# Patient Record
Sex: FEMALE | Race: WHITE | HISPANIC OR LATINO
[De-identification: names, ages, dates, MRNs, and addresses within clinical notes are randomized per-mention and may not be internally consistent; named-entity substitution may affect disease eponyms.]

---

## 2018-05-24 ENCOUNTER — APPOINTMENT (OUTPATIENT)
Dept: POPULATION HEALTH | Facility: CLINIC | Age: 23
End: 2018-05-24
Payer: COMMERCIAL

## 2018-05-24 PROCEDURE — 36415 COLL VENOUS BLD VENIPUNCTURE: CPT

## 2018-05-24 PROCEDURE — 99202 OFFICE O/P NEW SF 15 MIN: CPT | Mod: 25

## 2020-03-30 ENCOUNTER — EMERGENCY (EMERGENCY)
Facility: HOSPITAL | Age: 25
LOS: 1 days | Discharge: HOME | End: 2020-03-30
Attending: EMERGENCY MEDICINE | Admitting: EMERGENCY MEDICINE
Payer: MEDICAID

## 2020-03-30 VITALS
WEIGHT: 199.96 LBS | OXYGEN SATURATION: 97 % | SYSTOLIC BLOOD PRESSURE: 118 MMHG | HEIGHT: 67 IN | RESPIRATION RATE: 16 BRPM | HEART RATE: 100 BPM | TEMPERATURE: 100 F | DIASTOLIC BLOOD PRESSURE: 74 MMHG

## 2020-03-30 VITALS
OXYGEN SATURATION: 98 % | DIASTOLIC BLOOD PRESSURE: 63 MMHG | SYSTOLIC BLOOD PRESSURE: 110 MMHG | RESPIRATION RATE: 16 BRPM | TEMPERATURE: 100 F | HEART RATE: 81 BPM

## 2020-03-30 LAB — HCG UR QL: NEGATIVE — SIGNIFICANT CHANGE UP

## 2020-03-30 PROCEDURE — 99284 EMERGENCY DEPT VISIT MOD MDM: CPT

## 2020-03-30 PROCEDURE — 96374 THER/PROPH/DIAG INJ IV PUSH: CPT

## 2020-03-30 PROCEDURE — 81025 URINE PREGNANCY TEST: CPT

## 2020-03-30 PROCEDURE — 96375 TX/PRO/DX INJ NEW DRUG ADDON: CPT

## 2020-03-30 PROCEDURE — 99284 EMERGENCY DEPT VISIT MOD MDM: CPT | Mod: 25

## 2020-03-30 RX ORDER — ACETAMINOPHEN 500 MG
975 TABLET ORAL ONCE
Refills: 0 | Status: COMPLETED | OUTPATIENT
Start: 2020-03-30 | End: 2020-03-30

## 2020-03-30 RX ORDER — METOCLOPRAMIDE HCL 10 MG
10 TABLET ORAL ONCE
Refills: 0 | Status: COMPLETED | OUTPATIENT
Start: 2020-03-30 | End: 2020-03-30

## 2020-03-30 RX ORDER — SODIUM CHLORIDE 9 MG/ML
1000 INJECTION INTRAMUSCULAR; INTRAVENOUS; SUBCUTANEOUS ONCE
Refills: 0 | Status: COMPLETED | OUTPATIENT
Start: 2020-03-30 | End: 2020-03-30

## 2020-03-30 RX ORDER — ONDANSETRON 8 MG/1
1 TABLET, FILM COATED ORAL
Qty: 12 | Refills: 0
Start: 2020-03-30 | End: 2020-04-01

## 2020-03-30 RX ORDER — DIPHENHYDRAMINE HCL 50 MG
25 CAPSULE ORAL ONCE
Refills: 0 | Status: COMPLETED | OUTPATIENT
Start: 2020-03-30 | End: 2020-03-30

## 2020-03-30 RX ADMIN — Medication 975 MILLIGRAM(S): at 15:52

## 2020-03-30 RX ADMIN — Medication 25 MILLIGRAM(S): at 15:52

## 2020-03-30 RX ADMIN — SODIUM CHLORIDE 1000 MILLILITER(S): 9 INJECTION INTRAMUSCULAR; INTRAVENOUS; SUBCUTANEOUS at 15:53

## 2020-03-30 RX ADMIN — Medication 10 MILLIGRAM(S): at 15:52

## 2020-03-30 NOTE — ED ADULT NURSE NOTE - CAS ELECT INFOMATION PROVIDED
pt given CDC d/c instructions for covid 19 and advised to keep track of s/s, pt advised to get plenty of sleep, drink plenty of fluids, social distance, self isolate for fever and cough. don't share towels or food with anyone, no kissing, avoid contact with pets, throw tissues in garbage, cover cough  and return toED for shortness of breath and cough and pt verbalized understanding

## 2020-03-30 NOTE — ED ADULT NURSE REASSESSMENT NOTE - NS ED NURSE REASSESS COMMENT FT1
pt feels better headache gone and wants to go home pt re evaluated by md and to be d'c/d  iv d'c/d  no s/s infiltration upon removal  pt discharged stable and ambulatory in nad at present d/c instruction reinforced and pt verbalized understanding vital signs as charted  pt given CDC d/c instructions for covid 19 and advised to keep track of s/s, pt advised to get plenty of sleep, drink plenty of fluids, social distance, self isolate for fever and cough. don't share towels or food with anyone, no kissing, avoid contact with pets, throw tissues in garbage, cover cough  and return to ED for shortness of breath and cough and pt verbalized understanding

## 2020-03-30 NOTE — ED PROVIDER NOTE - CLINICAL SUMMARY MEDICAL DECISION MAKING FREE TEXT BOX
HA, fever, body aches, and nausea. mult family members with similar symptoms. suspect covid. no tachycardia or hypoxia. does not meet testing criteria in ED. supportive care discussed. no neuro deficits. will give IV fluids, reglan, benadryl, tylenol, reassess. lungs clear. do not suspect pneumonia

## 2020-03-30 NOTE — ED PROVIDER NOTE - CARE PLAN
Principal Discharge DX:	Acute headache  Secondary Diagnosis:	Viral illness  Secondary Diagnosis:	Nausea

## 2020-03-30 NOTE — ED PROVIDER NOTE - NEUROLOGICAL, MLM
Alert and oriented, no focal deficits, no motor or sensory deficits. symmetric eyebrow raise and smile. elevated tongue and shoulders without difficulty. normal finger to nose. good  strength bilaterally

## 2020-03-30 NOTE — ED PROVIDER NOTE - PATIENT PORTAL LINK FT
You can access the FollowMyHealth Patient Portal offered by [facility] by registering at the following website: [email or web address]. By joining [employer]’s FollowMyHealth portal, you will also be able to view your health information using other applications (apps) compatible with our system.

## 2020-03-30 NOTE — ED PROVIDER NOTE - OBJECTIVE STATEMENT
otherwise healthy 24 year old female presents with fever, body aches, nausea, and HA. woke up 5 days ago with HA. gradual in onset. next day had body aches, vomiting, and subjective fever. went to PCP Friday and had "3 injections". not sure what injections were. felt better. last night started to have dry cough. no chest pain or shortness of breath. continue to have nausea and HA. has not taken anything today for pain or symptoms. both father and mother has similar symptoms  PCP Oumar Beebe

## 2020-03-30 NOTE — ED PROVIDER NOTE - NSFOLLOWUPINSTRUCTIONS_ED_ALL_ED_FT
zofran every 6-8 hours for nausea       At this time you have symptoms concerning for COVID 19. Currently you do not meet criteria for inpatient admission. You are being sent home at this time for home isolation. It is currently recommended at this time  that you isolate for the next 14 days unless further instructions are provided at a later date. All those who have been in close contact with you should also voluntary go on home isolation.   When home on home isolation please try to use your own bathroom and bedroom and limit contact with those around you as much as possible.   Continue Tylenol per label instructions as needed for fever and body aches  Salt water rinses as needed for sore throat   Advance activity as tolerated  Please return to the ED for any concerning signs including increased difficulty breathing or signs of respiratory distress  If you chose to get tested there are several outpatient testing sites that you can call to schedule a time for outpatient testing   Phone number for [facility] is [phone]  Phone number for [facility] [phone]

## 2020-03-30 NOTE — ED PROVIDER NOTE - PROGRESS NOTE DETAILS
Jose GRISSOM for ED attending, Dr. Aly. 25 y/o female with no PMHx presents to ED c/o fever. Patient reports 5 days of headache, fever, non productive cough, nausea, 1 episode of vomiting. Patient has multiple family members at home with similar symptoms. No rash, photophobia, neck stiffness, weakness, numbness, abd pain, CP. On exam, well developed, well nourished no distress, eyes MARVEL, no photophobia, MMM, no meningisms, S1S2 RRR, CTA, abd soft non tender no CVAT, skin warm dry no rash. Neuro no motor sensory deficits. Jose GRISSOM for ED attending, Dr. Aly. 23 y/o female with no PMHx presents to ED c/o fever. Patient reports 5 days of headache, fever, non productive cough, nausea, 1 episode of vomiting. Patient has multiple family members at home with similar symptoms. No rash, photophobia, neck stiffness, weakness, numbness, abd pain, CP. On exam, well developed, well nourished no distress, eyes PERRL, pink conjunctiva, no photophobia, MMM, no meningismus, S1S2 RRR, lungs CTA, abd soft non tender no CVAT, skin warm dry no rash. Neuro no motor sensory deficits. Reevaluated patient at bedside.  Patient feeling much improved. HA and nausea resolved. no fever or body aches. no chest pain or shortness of breath.  suspect covid. supportive care discussed. no tachycardia or hypoxia.    An opportunity to ask questions was given.  Discussed the importance of prompt, close medical follow-up.  Patient will return with any changes, concerns or persistent / worsening symptoms.  Understanding of all instructions verbalized.

## 2022-07-05 ENCOUNTER — EMERGENCY (EMERGENCY)
Facility: HOSPITAL | Age: 27
LOS: 1 days | Discharge: HOME | End: 2022-07-05
Attending: EMERGENCY MEDICINE | Admitting: EMERGENCY MEDICINE
Payer: MEDICAID

## 2022-07-05 VITALS
HEART RATE: 78 BPM | RESPIRATION RATE: 16 BRPM | SYSTOLIC BLOOD PRESSURE: 118 MMHG | TEMPERATURE: 98 F | DIASTOLIC BLOOD PRESSURE: 73 MMHG | OXYGEN SATURATION: 99 %

## 2022-07-05 VITALS
TEMPERATURE: 98 F | RESPIRATION RATE: 15 BRPM | OXYGEN SATURATION: 99 % | HEIGHT: 67 IN | SYSTOLIC BLOOD PRESSURE: 116 MMHG | HEART RATE: 83 BPM | DIASTOLIC BLOOD PRESSURE: 78 MMHG | WEIGHT: 229.94 LBS

## 2022-07-05 PROBLEM — Z78.9 OTHER SPECIFIED HEALTH STATUS: Chronic | Status: ACTIVE | Noted: 2020-03-30

## 2022-07-05 LAB
ALBUMIN SERPL ELPH-MCNC: 3.7 G/DL — SIGNIFICANT CHANGE UP (ref 3.3–5)
ALP SERPL-CCNC: 82 U/L — SIGNIFICANT CHANGE UP (ref 30–120)
ALT FLD-CCNC: 20 U/L DA — SIGNIFICANT CHANGE UP (ref 10–60)
ANION GAP SERPL CALC-SCNC: 9 MMOL/L — SIGNIFICANT CHANGE UP (ref 5–17)
AST SERPL-CCNC: 8 U/L — LOW (ref 10–40)
BASOPHILS # BLD AUTO: 0.05 K/UL — SIGNIFICANT CHANGE UP (ref 0–0.2)
BASOPHILS NFR BLD AUTO: 0.5 % — SIGNIFICANT CHANGE UP (ref 0–2)
BILIRUB SERPL-MCNC: 0.7 MG/DL — SIGNIFICANT CHANGE UP (ref 0.2–1.2)
BUN SERPL-MCNC: 13 MG/DL — SIGNIFICANT CHANGE UP (ref 7–23)
CALCIUM SERPL-MCNC: 8.9 MG/DL — SIGNIFICANT CHANGE UP (ref 8.4–10.5)
CHLORIDE SERPL-SCNC: 103 MMOL/L — SIGNIFICANT CHANGE UP (ref 96–108)
CO2 SERPL-SCNC: 25 MMOL/L — SIGNIFICANT CHANGE UP (ref 22–31)
CREAT SERPL-MCNC: 0.5 MG/DL — SIGNIFICANT CHANGE UP (ref 0.5–1.3)
EGFR: 133 ML/MIN/1.73M2 — SIGNIFICANT CHANGE UP
EOSINOPHIL # BLD AUTO: 0.41 K/UL — SIGNIFICANT CHANGE UP (ref 0–0.5)
EOSINOPHIL NFR BLD AUTO: 4.5 % — SIGNIFICANT CHANGE UP (ref 0–6)
GLUCOSE SERPL-MCNC: 95 MG/DL — SIGNIFICANT CHANGE UP (ref 70–99)
HCG UR QL: NEGATIVE — SIGNIFICANT CHANGE UP
HCT VFR BLD CALC: 37.3 % — SIGNIFICANT CHANGE UP (ref 34.5–45)
HGB BLD-MCNC: 12.3 G/DL — SIGNIFICANT CHANGE UP (ref 11.5–15.5)
IMM GRANULOCYTES NFR BLD AUTO: 0.3 % — SIGNIFICANT CHANGE UP (ref 0–1.5)
LACTATE SERPL-SCNC: 0.6 MMOL/L — LOW (ref 0.7–2)
LYMPHOCYTES # BLD AUTO: 2.62 K/UL — SIGNIFICANT CHANGE UP (ref 1–3.3)
LYMPHOCYTES # BLD AUTO: 28.5 % — SIGNIFICANT CHANGE UP (ref 13–44)
MCHC RBC-ENTMCNC: 28 PG — SIGNIFICANT CHANGE UP (ref 27–34)
MCHC RBC-ENTMCNC: 33 GM/DL — SIGNIFICANT CHANGE UP (ref 32–36)
MCV RBC AUTO: 85 FL — SIGNIFICANT CHANGE UP (ref 80–100)
MONOCYTES # BLD AUTO: 0.4 K/UL — SIGNIFICANT CHANGE UP (ref 0–0.9)
MONOCYTES NFR BLD AUTO: 4.3 % — SIGNIFICANT CHANGE UP (ref 2–14)
NEUTROPHILS # BLD AUTO: 5.69 K/UL — SIGNIFICANT CHANGE UP (ref 1.8–7.4)
NEUTROPHILS NFR BLD AUTO: 61.9 % — SIGNIFICANT CHANGE UP (ref 43–77)
NRBC # BLD: 0 /100 WBCS — SIGNIFICANT CHANGE UP (ref 0–0)
PLATELET # BLD AUTO: 265 K/UL — SIGNIFICANT CHANGE UP (ref 150–400)
POTASSIUM SERPL-MCNC: 3.7 MMOL/L — SIGNIFICANT CHANGE UP (ref 3.5–5.3)
POTASSIUM SERPL-SCNC: 3.7 MMOL/L — SIGNIFICANT CHANGE UP (ref 3.5–5.3)
PROT SERPL-MCNC: 7.7 G/DL — SIGNIFICANT CHANGE UP (ref 6–8.3)
RBC # BLD: 4.39 M/UL — SIGNIFICANT CHANGE UP (ref 3.8–5.2)
RBC # FLD: 13.4 % — SIGNIFICANT CHANGE UP (ref 10.3–14.5)
SODIUM SERPL-SCNC: 137 MMOL/L — SIGNIFICANT CHANGE UP (ref 135–145)
WBC # BLD: 9.2 K/UL — SIGNIFICANT CHANGE UP (ref 3.8–10.5)
WBC # FLD AUTO: 9.2 K/UL — SIGNIFICANT CHANGE UP (ref 3.8–10.5)

## 2022-07-05 PROCEDURE — 96365 THER/PROPH/DIAG IV INF INIT: CPT

## 2022-07-05 PROCEDURE — 99285 EMERGENCY DEPT VISIT HI MDM: CPT

## 2022-07-05 PROCEDURE — 83605 ASSAY OF LACTIC ACID: CPT

## 2022-07-05 PROCEDURE — 93971 EXTREMITY STUDY: CPT

## 2022-07-05 PROCEDURE — 99284 EMERGENCY DEPT VISIT MOD MDM: CPT | Mod: 25

## 2022-07-05 PROCEDURE — 93971 EXTREMITY STUDY: CPT | Mod: 26,LT

## 2022-07-05 PROCEDURE — 80053 COMPREHEN METABOLIC PANEL: CPT

## 2022-07-05 PROCEDURE — 81025 URINE PREGNANCY TEST: CPT

## 2022-07-05 PROCEDURE — 87040 BLOOD CULTURE FOR BACTERIA: CPT

## 2022-07-05 PROCEDURE — 36415 COLL VENOUS BLD VENIPUNCTURE: CPT

## 2022-07-05 PROCEDURE — 85025 COMPLETE CBC W/AUTO DIFF WBC: CPT

## 2022-07-05 RX ADMIN — Medication 600 MILLIGRAM(S): at 16:46

## 2022-07-05 RX ADMIN — Medication 100 MILLIGRAM(S): at 16:23

## 2022-07-05 NOTE — ED PROVIDER NOTE - MUSCULOSKELETAL, MLM
+scaly excoriated skin noted to left anterior lower leg with surrounding erythema noted to left lower leg extending to posterior lower leg with swelling of LLE and slightly increased warmth, no vesicles or drainage noted, no streaking noted, calf NT, neg homanns signs, toes warm & mobile, distal pulses and sensation intact, NVI

## 2022-07-05 NOTE — ED PROVIDER NOTE - NSFOLLOWUPINSTRUCTIONS_ED_ALL_ED_FT
Follow up with your PMD in 2 days for re-evaluation, ongoing care and treatment. Elevate leg, take full course of antibiotics as prescribed. If having worsening of symptoms, streaking, fever or other related symptoms, RETURN TO THE ER IMMEDIATELY.     Cellulitis, Adult       Cellulitis is a skin infection. The infected area is often warm, red, swollen, and sore. It occurs most often in the arms and lower legs. It is very important to get treated for this condition.      What are the causes?    This condition is caused by bacteria. The bacteria enter through a break in the skin, such as a cut, burn, insect bite, open sore, or crack.      What increases the risk?    This condition is more likely to occur in people who:  •Have a weak body defense system (immune system).       •Have open cuts, burns, bites, or scrapes on the skin.      •Are older than 60 years of age.      •Have a blood sugar problem (diabetes).       •Have a long-lasting (chronic) liver disease (cirrhosis) or kidney disease.      •Are very overweight (obese).    •Have a skin problem, such as:  •Itchy rash (eczema).      •Slow movement of blood in the veins (venous stasis).      •Fluid buildup below the skin (edema).        •Have been treated with high-energy rays (radiation).      •Use IV drugs.         What are the signs or symptoms?    Symptoms of this condition include:•Skin that is:  •Red.      •Streaking.      •Spotting.      •Swollen.      •Sore or painful when you touch it.      •Warm.        •A fever.      •Chills.       •Blisters.        How is this diagnosed?    This condition is diagnosed based on:  •Medical history.      •Physical exam.      •Blood tests.      •Imaging tests.        How is this treated?    Treatment for this condition may include:  •Medicines to treat infections or allergies.    •Home care, such as:  •Rest.      •Placing cold or warm cloths (compresses) on the skin.        •Hospital care, if the condition is very bad.        Follow these instructions at home:    Medicines     •Take over-the-counter and prescription medicines only as told by your doctor.      •If you were prescribed an antibiotic medicine, take it as told by your doctor. Do not stop taking it even if you start to feel better.        General instructions      •Drink enough fluid to keep your pee (urine) pale yellow.      • Do not touch or rub the infected area.      •Raise (elevate) the infected area above the level of your heart while you are sitting or lying down.      •Place cold or warm cloths on the area as told by your doctor.      •Keep all follow-up visits as told by your doctor. This is important.        Contact a doctor if:    •You have a fever.      •You do not start to get better after 1–2 days of treatment.      •Your bone or joint under the infected area starts to hurt after the skin has healed.      •Your infection comes back. This can happen in the same area or another area.      •You have a swollen bump in the area.      •You have new symptoms.      •You feel ill and have muscle aches and pains.        Get help right away if:    •Your symptoms get worse.      •You feel very sleepy.      •You throw up (vomit) or have watery poop (diarrhea) for a long time.      •You see red streaks coming from the area.      •Your red area gets larger.      •Your red area turns dark in color.      These symptoms may represent a serious problem that is an emergency. Do not wait to see if the symptoms will go away. Get medical help right away. Call your local emergency services (911 in the [place] Do not drive yourself to the hospital.       Summary    •Cellulitis is a skin infection. The area is often warm, red, swollen, and sore.      •This condition is treated with medicines, rest, and cold and warm cloths.      •Take all medicines only as told by your doctor.      •Tell your doctor if symptoms do not start to get better after 1–2 days of treatment.      This information is not intended to replace advice given to you by your health care provider. Make sure you discuss any questions you have with your health care provider.

## 2022-07-05 NOTE — ED ADULT NURSE NOTE - OBJECTIVE STATEMENT
Patient presents for evaluation of redness and swelling to the right lower leg. Patient states she began with a rash to the left anterior ankle about 1 week ago after an outdoor dining meal. The rash was itchy so she applied rubbing alcohol but was scratching a lot and noted similar rash to the medial aspect of her right shin and increasing on the left lower leg. Patient then applied Benadryl cream which she feels increased the redness and itchiness. Patient saw her PMD today, rash edges were marked by her Dr. and she was sent to the ED for further eval. Patient denies any pain, but feels tightness of the skin and swelling.

## 2022-07-05 NOTE — ED PROVIDER NOTE - PATIENT PORTAL LINK FT
You can access the FollowMyHealth Patient Portal offered by [facility] by registering at the following website: [email or web address]. By joining [employer]’s FollowMyHealth portal, you will also be able to view your health information using other applications (apps) compatible with our system.

## 2022-07-05 NOTE — ED PROVIDER NOTE - CLINICAL SUMMARY MEDICAL DECISION MAKING FREE TEXT BOX
27 y/o female with no pertinent PMHx presents to the ED c/o redness and swelling to her left leg, worsening over the past 2 weeks. Pt states she developed an itchy rash about 2 weeks ago and was scratching it a lot. States the redness spread and she also developed swelling, so came to the ED for evaluation. Denies any outdoor contacts. Denies fever.    PE: Vital signs stable, afebrile, in no distress. Left leg with erythema, swelling and tenderness from ankle to mid-calf. +Area of scaly, excoriated skin to anterior calf. No vesicles or drainage. No posterior calf tenderness.    Impression: left leg cellulitis, rule-out DVT. Plan: labs, Doppler, antibiotics, and PCP follow-up.

## 2022-07-05 NOTE — ED PROVIDER NOTE - NS ED ATTENDING STATEMENT MOD
This was a shared visit with the [facility]. I reviewed and verified the documentation and independently performed the documented:

## 2022-07-05 NOTE — ED PROVIDER NOTE - OBJECTIVE STATEMENT
27 y/o F with no pmhx presents with c/o redness to left lower leg x 2 weeks. States that she initially noted mild redness to the region of her ankle on 6/18 and then started progressing with some burning/itching. Pt applied benadryl cream and states that it got worse after. Pt c/o swelling and tightness to her lower leg L. States that she was seen by her PCP today, Dr. Beebe and sent to ED for US and IV abx. Pt denies fever, chills, numbness, tingling, streaking, trauma, CP, SOB, hx of dvt/pe, N/V, calf pain or other symptoms.

## 2022-07-05 NOTE — ED PROVIDER NOTE - PROGRESS NOTE DETAILS
Reevaluated patient at bedside.  Patient feeling much improved.  Discussed the results of all diagnostic testing in ED and copies of all reports given. Advised will rx clindamycin, f/u PCP for recheck in 2 days, strict return precautions for streaking, fever, worsening symptoms.  An opportunity to ask questions was given.  Discussed the importance of prompt, close medical follow-up.  Patient will return with any changes, concerns or persistent / worsening symptoms.  Understanding of all instructions verbalized.

## 2022-07-05 NOTE — ED PROVIDER NOTE - SKIN, MLM
+scaly excoriated skin noted to left anterior lower leg with surrounding erythema noted to left lower leg extending to posterior lower leg, no vesicles or drainage noted, no streaking

## 2022-07-07 ENCOUNTER — INPATIENT (INPATIENT)
Facility: HOSPITAL | Age: 27
LOS: 3 days | Discharge: HOME | DRG: 607 | End: 2022-07-11
Attending: HOSPITALIST | Admitting: STUDENT IN AN ORGANIZED HEALTH CARE EDUCATION/TRAINING PROGRAM
Payer: MEDICAID

## 2022-07-07 VITALS
DIASTOLIC BLOOD PRESSURE: 98 MMHG | WEIGHT: 229.94 LBS | HEIGHT: 67 IN | TEMPERATURE: 99 F | OXYGEN SATURATION: 99 % | RESPIRATION RATE: 18 BRPM | HEART RATE: 89 BPM | SYSTOLIC BLOOD PRESSURE: 133 MMHG

## 2022-07-07 PROCEDURE — 99285 EMERGENCY DEPT VISIT HI MDM: CPT

## 2022-07-08 DIAGNOSIS — Z29.9 ENCOUNTER FOR PROPHYLACTIC MEASURES, UNSPECIFIED: ICD-10-CM

## 2022-07-08 DIAGNOSIS — L03.116 CELLULITIS OF LEFT LOWER LIMB: ICD-10-CM

## 2022-07-08 DIAGNOSIS — L03.90 CELLULITIS, UNSPECIFIED: ICD-10-CM

## 2022-07-08 LAB
ALBUMIN SERPL ELPH-MCNC: 3.9 G/DL — SIGNIFICANT CHANGE UP (ref 3.3–5)
ALP SERPL-CCNC: 81 U/L — SIGNIFICANT CHANGE UP (ref 40–120)
ALT FLD-CCNC: 12 U/L — SIGNIFICANT CHANGE UP (ref 10–45)
ANION GAP SERPL CALC-SCNC: 10 MMOL/L — SIGNIFICANT CHANGE UP (ref 5–17)
AST SERPL-CCNC: 9 U/L — LOW (ref 10–40)
BASOPHILS # BLD AUTO: 0.05 K/UL — SIGNIFICANT CHANGE UP (ref 0–0.2)
BASOPHILS NFR BLD AUTO: 0.5 % — SIGNIFICANT CHANGE UP (ref 0–2)
BILIRUB SERPL-MCNC: 0.6 MG/DL — SIGNIFICANT CHANGE UP (ref 0.2–1.2)
BUN SERPL-MCNC: 17 MG/DL — SIGNIFICANT CHANGE UP (ref 7–23)
CALCIUM SERPL-MCNC: 8.6 MG/DL — SIGNIFICANT CHANGE UP (ref 8.4–10.5)
CHLORIDE SERPL-SCNC: 104 MMOL/L — SIGNIFICANT CHANGE UP (ref 96–108)
CK SERPL-CCNC: 73 U/L — SIGNIFICANT CHANGE UP (ref 25–170)
CO2 SERPL-SCNC: 23 MMOL/L — SIGNIFICANT CHANGE UP (ref 22–31)
CREAT SERPL-MCNC: 0.76 MG/DL — SIGNIFICANT CHANGE UP (ref 0.5–1.3)
EGFR: 111 ML/MIN/1.73M2 — SIGNIFICANT CHANGE UP
EOSINOPHIL # BLD AUTO: 0.45 K/UL — SIGNIFICANT CHANGE UP (ref 0–0.5)
EOSINOPHIL NFR BLD AUTO: 4.7 % — SIGNIFICANT CHANGE UP (ref 0–6)
ERYTHROCYTE [SEDIMENTATION RATE] IN BLOOD: 34 MM/HR — HIGH (ref 0–15)
FLUAV AG NPH QL: SIGNIFICANT CHANGE UP
FLUBV AG NPH QL: SIGNIFICANT CHANGE UP
GLUCOSE SERPL-MCNC: 107 MG/DL — HIGH (ref 70–99)
HCT VFR BLD CALC: 34.3 % — LOW (ref 34.5–45)
HGB BLD-MCNC: 10.9 G/DL — LOW (ref 11.5–15.5)
IMM GRANULOCYTES NFR BLD AUTO: 0.4 % — SIGNIFICANT CHANGE UP (ref 0–1.5)
LYMPHOCYTES # BLD AUTO: 2.96 K/UL — SIGNIFICANT CHANGE UP (ref 1–3.3)
LYMPHOCYTES # BLD AUTO: 30.9 % — SIGNIFICANT CHANGE UP (ref 13–44)
MAGNESIUM SERPL-MCNC: 2 MG/DL — SIGNIFICANT CHANGE UP (ref 1.6–2.6)
MCHC RBC-ENTMCNC: 27.7 PG — SIGNIFICANT CHANGE UP (ref 27–34)
MCHC RBC-ENTMCNC: 31.8 GM/DL — LOW (ref 32–36)
MCV RBC AUTO: 87.3 FL — SIGNIFICANT CHANGE UP (ref 80–100)
MONOCYTES # BLD AUTO: 0.54 K/UL — SIGNIFICANT CHANGE UP (ref 0–0.9)
MONOCYTES NFR BLD AUTO: 5.6 % — SIGNIFICANT CHANGE UP (ref 2–14)
NEUTROPHILS # BLD AUTO: 5.55 K/UL — SIGNIFICANT CHANGE UP (ref 1.8–7.4)
NEUTROPHILS NFR BLD AUTO: 57.9 % — SIGNIFICANT CHANGE UP (ref 43–77)
NRBC # BLD: 0 /100 WBCS — SIGNIFICANT CHANGE UP (ref 0–0)
PLATELET # BLD AUTO: 251 K/UL — SIGNIFICANT CHANGE UP (ref 150–400)
POTASSIUM SERPL-MCNC: 3.8 MMOL/L — SIGNIFICANT CHANGE UP (ref 3.5–5.3)
POTASSIUM SERPL-SCNC: 3.8 MMOL/L — SIGNIFICANT CHANGE UP (ref 3.5–5.3)
PROT SERPL-MCNC: 7 G/DL — SIGNIFICANT CHANGE UP (ref 6–8.3)
RBC # BLD: 3.93 M/UL — SIGNIFICANT CHANGE UP (ref 3.8–5.2)
RBC # FLD: 13.4 % — SIGNIFICANT CHANGE UP (ref 10.3–14.5)
RSV RNA NPH QL NAA+NON-PROBE: SIGNIFICANT CHANGE UP
SARS-COV-2 RNA SPEC QL NAA+PROBE: SIGNIFICANT CHANGE UP
SODIUM SERPL-SCNC: 137 MMOL/L — SIGNIFICANT CHANGE UP (ref 135–145)
WBC # BLD: 9.59 K/UL — SIGNIFICANT CHANGE UP (ref 3.8–10.5)
WBC # FLD AUTO: 9.59 K/UL — SIGNIFICANT CHANGE UP (ref 3.8–10.5)

## 2022-07-08 PROCEDURE — 99221 1ST HOSP IP/OBS SF/LOW 40: CPT

## 2022-07-08 PROCEDURE — 12345: CPT | Mod: NC

## 2022-07-08 PROCEDURE — 99223 1ST HOSP IP/OBS HIGH 75: CPT

## 2022-07-08 RX ORDER — VANCOMYCIN HCL 1 G
1000 VIAL (EA) INTRAVENOUS EVERY 8 HOURS
Refills: 0 | Status: DISCONTINUED | OUTPATIENT
Start: 2022-07-08 | End: 2022-07-10

## 2022-07-08 RX ORDER — FAMOTIDINE 10 MG/ML
20 INJECTION INTRAVENOUS ONCE
Refills: 0 | Status: COMPLETED | OUTPATIENT
Start: 2022-07-08 | End: 2022-07-08

## 2022-07-08 RX ORDER — ENOXAPARIN SODIUM 100 MG/ML
40 INJECTION SUBCUTANEOUS EVERY 24 HOURS
Refills: 0 | Status: DISCONTINUED | OUTPATIENT
Start: 2022-07-08 | End: 2022-07-09

## 2022-07-08 RX ORDER — CHLORHEXIDINE GLUCONATE 213 G/1000ML
1 SOLUTION TOPICAL DAILY
Refills: 0 | Status: DISCONTINUED | OUTPATIENT
Start: 2022-07-08 | End: 2022-07-11

## 2022-07-08 RX ORDER — VANCOMYCIN HCL 1 G
1000 VIAL (EA) INTRAVENOUS EVERY 12 HOURS
Refills: 0 | Status: DISCONTINUED | OUTPATIENT
Start: 2022-07-08 | End: 2022-07-08

## 2022-07-08 RX ORDER — CEFAZOLIN SODIUM 1 G
2000 VIAL (EA) INJECTION ONCE
Refills: 0 | Status: COMPLETED | OUTPATIENT
Start: 2022-07-08 | End: 2022-07-08

## 2022-07-08 RX ORDER — LORATADINE 10 MG/1
10 TABLET ORAL DAILY
Refills: 0 | Status: DISCONTINUED | OUTPATIENT
Start: 2022-07-08 | End: 2022-07-11

## 2022-07-08 RX ADMIN — Medication 100 MILLIGRAM(S): at 00:48

## 2022-07-08 RX ADMIN — Medication 1 APPLICATION(S): at 18:14

## 2022-07-08 RX ADMIN — Medication 250 MILLIGRAM(S): at 18:46

## 2022-07-08 RX ADMIN — CHLORHEXIDINE GLUCONATE 1 APPLICATION(S): 213 SOLUTION TOPICAL at 12:51

## 2022-07-08 RX ADMIN — Medication 250 MILLIGRAM(S): at 06:06

## 2022-07-08 RX ADMIN — ENOXAPARIN SODIUM 40 MILLIGRAM(S): 100 INJECTION SUBCUTANEOUS at 06:06

## 2022-07-08 NOTE — ED ADULT NURSE REASSESSMENT NOTE - NS ED NURSE REASSESS COMMENT FT1
Lab contacted, states covid swab will result at 530, Inpatient MD at bedside, pt resting comfortably, pending floor bed

## 2022-07-08 NOTE — H&P ADULT - NSHPLABSRESULTS_GEN_ALL_CORE
LABS:                      10.9   9.59  )-----------( 251      ( 08 Jul 2022 01:07 )             34.3     07-08    137  |  104  |  17  ----------------------------<  107<H>  3.8   |  23  |  0.76    Ca    8.6      08 Jul 2022 01:07  Mg     2.0     07-08    TPro  7.0  /  Alb  3.9  /  TBili  0.6  /  DBili  x   /  AST  9<L>  /  ALT  12  /  AlkPhos  81  07-08    CARDIAC MARKERS ( 08 Jul 2022 01:07 )  x     / x     / 73 U/L / x     / x        LIVER FUNCTIONS - ( 08 Jul 2022 01:07 )  Alb: 3.9 g/dL / Pro: 7.0 g/dL / ALK PHOS: 81 U/L / ALT: 12 U/L / AST: 9 U/L / GGT: x     IMAGING:   US Duplex Venous Lower Ext Ltd, Left (07.05.22 @ 15:42)  IMPRESSION:  - No evidence of left lower extremity deep venous thrombosis.    [X] Imaging personally reviewed by me

## 2022-07-08 NOTE — CONSULT NOTE ADULT - ASSESSMENT
Ms Rosado is a 27yo F w/ no PMH p/w LLE erythema, edema, warmth and tenderness to palpation x2wks. Symptoms initially started as mild redness to the region of her left ankle on 6/18 and then started progressing with some burning/itching. Pt initially applied benadryl cream to her entire leg which worsened the erythema. Associated symptoms at the time included edema and tightness. She then presented to her PMD on 07/05 who advised her to visit the ED for evaluation. There she underwent LLE US which was negative for DVT, and cultures were drawn, which have also been negative to date. She was administered Clindamycin and discharged on oral Clindamycin.   Pt then re-presented to the ED on 07/07 due to worsening of symptoms and expansion of area of erythema. Here pt is afebrile with no leukocytosis. In this ED visit she was administered Ancef given pen allergy after which she developed pruritis throughout her body. ID consulted for the same,      WORKUP  Culture - Blood (7/5):   No growth to date.  Sedimentation Rate, Erythrocyte: 34 mm/hr (07-08-22 @ 01:06)  US Duplex Venous Lower Ext Ltd, Left (07.05): No evidence of left lower extremity deep venous thrombosis.    DIAGNOSIS and IMPRESSION  ·	Cellulitis  ·	Penicllin and Cephalosporin allergy (itching, rash)    Afebrile with no leukocytosi  s/p vanc in ED    RECOMMENDATIONS        PT TO BE SEEN. PRELIM NOTE  PENDING RECS. PLEASE WAIT FOR FINAL RECS AFTER DISCUSSION WITH ATTENDINGDelfina Oneal MD, PGY5  ID fellow  [employer] Preferred  After 5pm/weekends call [phone]   Ms Rosado is a 25yo F w/ no PMH p/w LLE erythema, edema, warmth and tenderness to palpation x2wks. Symptoms initially started as mild redness to the region of her left ankle on 6/18 and then started progressing with some itching up her calf. As it started to worsen pt applied benadryl cream to her entire leg which she feels may have worsened the erythema. The redness continued to worsen and was associated with pain and swelling and she then presented to her PMD on 07/05 who advised her to visit the [facility] ED for evaluation. She was administered Clindamycin and discharged on oral Clindamycin. Pt then re-presented to the ED on 07/07 due to worsening of symptoms and expansion of area of erythema. ID consulted for the same.     WORKUP  Culture - Blood (7/5):   No growth to date.  Sedimentation Rate, Erythrocyte: 34 mm/hr (07-08-22 @ 01:06)  US Duplex Venous Lower Ext Ltd, Left (07.05): No evidence of left lower extremity deep venous thrombosis.  Blood cx (7/5): NGTD -> in HIE    DIAGNOSIS and IMPRESSION  ·	Cellulitis  ·	Penicillin and Cephalosporin allergy (itching, rash)  Afebrile with no leukocytosis  s/p Ancef in ED: She developed pruritis and hives throughout her body.  s/p Vanc in ED    RECOMMENDATIONS  c/w vanc for now      PT TO BE SEEN. PRELIM NOTE  PENDING RECS. PLEASE WAIT FOR FINAL RECS AFTER DISCUSSION WITH ATTENDINGDelfina Oneal MD, PGY5  ID fellow  [employer] Preferred  After 5pm/weekends call [phone]   Ms Rosado is a 27yo F w/ no PMH p/w LLE erythema, edema, warmth and tenderness to palpation x2wks. Symptoms initially started as mild redness to the region of her left ankle on 6/18 and then started progressing with some itching up her calf. As it started to worsen pt applied benadryl cream to her entire leg which she feels may have worsened the erythema. The redness continued to worsen and was associated with pain and swelling and she then presented to her PMD on 07/05 who advised her to visit the [facility] ED for evaluation. She was administered Clindamycin and discharged on oral Clindamycin. Pt then re-presented to the ED on 07/07 due to worsening of symptoms and expansion of area of erythema. ID consulted for the same.     WORKUP  Culture - Blood (7/5):   No growth to date.  Sedimentation Rate, Erythrocyte: 34 mm/hr (07-08-22 @ 01:06)  US Duplex Venous Lower Ext Ltd, Left (07.05): No evidence of left lower extremity deep venous thrombosis.  Blood cx (7/5): NGTD -> in HIE    DIAGNOSIS and IMPRESSION  ·	Cellulitis  ·	Penicillin and Cephalosporin allergy (itching, rash)  Afebrile with no leukocytosis  Failed outpt Clindamycin x 2 days  s/p Ancef in ED: She developed pruritis and hives throughout her body.  s/p Vanc in ED    RECOMMENDATIONS  c/w vanc for now -> Increase dose t o1gm Q8  Please check Vancomycin trough before 4th sequential dose. Target trough levels 15-20  Given significant itching, atypical matching pink rash on other leg recommend derm risa Urbina for fevers and trend WBC    Pt seen and examined Case d/w attending and primary team    Bebeto Oneal MD, PGY5  ID fellow  [employer] Teams Preferred  After 5pm/weekends call [phone]

## 2022-07-08 NOTE — ED PROVIDER NOTE - CLINICAL SUMMARY MEDICAL DECISION MAKING FREE TEXT BOX
26-year-old female with no reported past medical history presents to the ED with a rash over the left lower extremity over the last 2 weeks. vitals non actionable. PE as noted above. rash over the anterior shin. has been using po abx with further progression of cellulitis. will give abx iv, labs, reassess. DVT sudy few days ago negative. 26-year-old female with no reported past medical history presents to the ED with a rash over the left lower extremity over the last 2 weeks. vitals non actionable. PE as noted above. rash over the anterior shin. has been using po abx with further progression of cellulitis. will give abx iv, labs, reassess. DVT study few days ago negative.    THERESA Billingsley MD: Agree with resident/ACP MDM, assessment and plan as above.

## 2022-07-08 NOTE — CONSULT NOTE ADULT - SUBJECTIVE AND OBJECTIVE BOX
Patient is a 26y old  Female who presents with a chief complaint of Cellulitis (08 Jul 2022 05:03)    HPI: Ms Rosado is a 27yo F w/ no PMH p/w LLE erythema, edema, warmth and tenderness to palpation x2wks. Symptoms initially started as mild redness to the region of her left ankle on 6/18 and then started progressing with some burning/itching. Pt initially applied benadryl cream to her entire leg which worsened the erythema. Associated symptoms at the time included edema and tightness. She then presented to her PMD on 07/05 who advised her to visit the ED for evaluation. There she underwent LLE US which was negative for DVT, and cultures were drawn, which have also been negative to date. She was administered Clindamycin and discharged on oral Clindamycin.   Pt then re-presented to the ED on 07/07 due to worsening of symptoms and expansion of area of erythema. Here pt is afebrile with no leukocytosis. In this ED visit she was administered Ancef given pen allergy after which she developed pruritis throughout her body. ID consulted for the same,    She otherwise denies any F/C, numbness, tingling, trauma to the leg, CP, SOB, hx of DVT/PE, N/V, calf pain or other symptoms. No recent travel or outdoor activities.           REVIEW OF SYSTEMS  [  ] ROS unobtainable because:    [ x ] All other systems negative except as noted below    Constitutional:  [ ] fever [ ] chills  [ ] weight loss  [ ]night sweat  [ ]poor appetite/PO intake [ ]fatigue   Skin:  [ ] rash [ ] phlebitis	  Eyes: [ ] icterus [ ] pain  [ ] discharge	  ENMT: [ ] sore throat  [ ] thrush [ ] ulcers [ ] exudates [ ]anosmia  Respiratory: [ ] dyspnea [ ] hemoptysis [ ] cough [ ] sputum	  Cardiovascular:  [ ] chest pain [ ] palpitations [ ] edema	  Gastrointestinal:  [ ] nausea [ ] vomiting [ ] diarrhea [ ] constipation [ ] pain	  Genitourinary:  [ ] dysuria [ ] frequency [ ] hematuria [ ] discharge [ ] flank pain  [ ] incontinence  Musculoskeletal:  [ ] myalgias [ ] arthralgias [ ] arthritis  [ ] back pain  Neurological:  [ ] headache [ ] weakness [ ] seizures  [ ] confusion/altered mental status    prior hospital charts reviewed [V]  primary team notes reviewed [V]  other consultant notes reviewed [V]    PAST MEDICAL & SURGICAL HISTORY:  No pertinent past medical history      No significant past surgical history          SOCIAL HISTORY:  - Denied smoking/vaping/alcohol/recreational drug use    FAMILY HISTORY:  FH: asthma (Mother)        Allergies  Benadryl (Other)  cefazolin (Hives; Rash)  Demerol (Other)  latex (Rash)  penicillin (Rash)  Reglan (Dystonic RXN)        ANTIMICROBIALS:  vancomycin  IVPB 1000 every 12 hours      ANTIMICROBIALS (past 90 days):  MEDICATIONS  (STANDING):  ceFAZolin   IVPB   100 mL/Hr IV Intermittent (07-08-22 @ 00:48)    vancomycin  IVPB   250 mL/Hr IV Intermittent (07-08-22 @ 06:06)        OTHER MEDS:   MEDICATIONS  (STANDING):  enoxaparin Injectable 40 every 24 hours  loratadine 10 daily      VITALS:  Vital Signs Last 24 Hrs  T(F): 98.7 (07-08-22 @ 11:15), Max: 98.8 (07-07-22 @ 19:42)    Vital Signs Last 24 Hrs  HR: 80 (07-08-22 @ 11:15) (78 - 90)  BP: 115/75 (07-08-22 @ 10:56) (104/66 - 133/98)  RR: 18 (07-08-22 @ 11:15)  SpO2: 100% (07-08-22 @ 11:15) (98% - 100%)  Wt(kg): --    EXAM:    GA: NAD, AOx3  HEENT: oral cavity no lesion  CV: nl S1/S2, no RMG  Lungs: CTAB, No distress  Abd: BS+, soft, nontender, no rebounding pain  Ext: no edema  Neuro: No focal deficits  Skin: Intact  IV: no phlebitis    Labs:                        10.9   9.59  )-----------( 251      ( 08 Jul 2022 01:07 )             34.3     07-08    137  |  104  |  17  ----------------------------<  107<H>  3.8   |  23  |  0.76    Ca    8.6      08 Jul 2022 01:07  Mg     2.0     07-08    TPro  7.0  /  Alb  3.9  /  TBili  0.6  /  DBili  x   /  AST  9<L>  /  ALT  12  /  AlkPhos  81  07-08      WBC Trend:  WBC Count: 9.59 (07-08-22 @ 01:07)  WBC Count: 9.20 (07-05-22 @ 15:17)      Auto Neutrophil #: 5.55 K/uL (07-08-22 @ 01:07)  Auto Neutrophil #: 5.69 K/uL (07-05-22 @ 15:17)      Creatine Trend:  Creatinine, Serum: 0.76 (07-08)  Creatinine, Serum: 0.50 (07-05)      Liver Biochemical Testing Trend:  Alanine Aminotransferase (ALT/SGPT): 12 (07-08)  Alanine Aminotransferase (ALT/SGPT): 20 (07-05)  Aspartate Aminotransferase (AST/SGOT): 9 (07-08-22 @ 01:07)  Aspartate Aminotransferase (AST/SGOT): 8 (07-05-22 @ 15:17)  Bilirubin Total, Serum: 0.6 (07-08)  Bilirubin Total, Serum: 0.7 (07-05)      Trend LDH      Auto Eosinophil %: 4.7 % (07-08-22 @ 01:07)  Auto Eosinophil %: 4.5 % (07-05-22 @ 15:17)          MICROBIOLOGY:        Culture - Blood (collected 05 Jul 2022 15:17)  Source: .Blood Blood  Preliminary Report:    No growth to date.    Culture - Blood (collected 05 Jul 2022 15:17)  Source: .Blood Blood  Preliminary Report:    No growth to date.      Lactate, Blood: 0.6 (07-05 @ 15:17)  Sedimentation Rate, Erythrocyte: 34 mm/hr (07-08-22 @ 01:06)    RADIOLOGY:  imaging below personally reviewed    < from: US Duplex Venous Lower Ext Ltd, Left (07.05.22 @ 15:42) >  No evidence of left lower extremity deep venous thrombosis.  < end of copied text >   Patient is a 26y old  Female who presents with a chief complaint of Cellulitis (08 Jul 2022 05:03)    HPI: Ms Rosado is a 27yo F w/ no PMH p/w LLE erythema, edema, warmth and tenderness to palpation x2wks. Symptoms initially started as mild redness to the region of her left ankle on 6/18 and then started progressing with some itching up her calf. As it started to worsen pt applied benadryl cream to her entire leg which she feels may have worsened the erythema. The redness continued to worsen and was associated with pain and swelling and she then presented to her PMD on 07/05 who advised her to visit the ED for evaluation. She went to [facility] and there she underwent LLE US which was negative for DVT, and cultures were drawn, which are negative to date. She was administered Clindamycin and discharged on oral Clindamycin.   Pt then re-presented to the ED on 07/07 due to worsening of symptoms and expansion of area of erythema. Here pt is afebrile with no leukocytosis. In this ED visit she was administered Ancef given pen allergy after which she developed pruritis and hives throughout her body. ID consulted for the same. She otherwise denies any F/C, numbness, tingling, trauma to the leg, CP, SOB, hx of DVT/PE, N/V, calf pain or other symptoms. No recent travel or outdoor activities. No abnormal food, including no recent sea food. No recent exposure to fresh or sea water. Pt has a pet dog, but no recent bites or scratches.       REVIEW OF SYSTEMS  [  ] ROS unobtainable because:    [ x ] All other systems negative except as noted below    Constitutional:  [ ] fever [ ] chills  [ ] weight loss  [ ]night sweat  [ ]poor appetite/PO intake [ ]fatigue   Skin:  [ ] rash [ ] phlebitis	  Eyes: [ ] icterus [ ] pain  [ ] discharge	  ENMT: [ ] sore throat  [ ] thrush [ ] ulcers [ ] exudates [ ]anosmia  Respiratory: [ ] dyspnea [ ] hemoptysis [ ] cough [ ] sputum	  Cardiovascular:  [ ] chest pain [ ] palpitations [ ] edema	  Gastrointestinal:  [ ] nausea [ ] vomiting [ ] diarrhea [ ] constipation [ ] pain	  Genitourinary:  [ ] dysuria [ ] frequency [ ] hematuria [ ] discharge [ ] flank pain  [ ] incontinence  Musculoskeletal:  [ ] myalgias [ ] arthralgias [ ] arthritis  [ ] back pain  Neurological:  [ ] headache [ ] weakness [ ] seizures  [ ] confusion/altered mental status    prior hospital charts reviewed [V]  primary team notes reviewed [V]  other consultant notes reviewed [V]    PAST MEDICAL & SURGICAL HISTORY:  No pertinent past medical history      No significant past surgical history          SOCIAL HISTORY:  - Denied smoking/vaping/alcohol/recreational drug use    FAMILY HISTORY:  FH: asthma (Mother)        Allergies  Benadryl (Other)  cefazolin (Hives; Rash)  Demerol (Other)  latex (Rash)  penicillin (Rash)  Reglan (Dystonic RXN)        ANTIMICROBIALS:  vancomycin  IVPB 1000 every 12 hours      ANTIMICROBIALS (past 90 days):  MEDICATIONS  (STANDING):  ceFAZolin   IVPB   100 mL/Hr IV Intermittent (07-08-22 @ 00:48)    vancomycin  IVPB   250 mL/Hr IV Intermittent (07-08-22 @ 06:06)        OTHER MEDS:   MEDICATIONS  (STANDING):  enoxaparin Injectable 40 every 24 hours  loratadine 10 daily      VITALS:  Vital Signs Last 24 Hrs  T(F): 98.7 (07-08-22 @ 11:15), Max: 98.8 (07-07-22 @ 19:42)    Vital Signs Last 24 Hrs  HR: 80 (07-08-22 @ 11:15) (78 - 90)  BP: 115/75 (07-08-22 @ 10:56) (104/66 - 133/98)  RR: 18 (07-08-22 @ 11:15)  SpO2: 100% (07-08-22 @ 11:15) (98% - 100%)  Wt(kg): --    EXAM:    Constitutional: Not in acute distress  Eyes: pupils bilaterally reactive to light. No icterus.  Oral cavity: Clear, no lesions  Neck: No neck vein distension noted  RS: Chest clear to auscultation bilaterally. No wheeze/rhonchi/crepitations.  CVS: S1, S2 heard. Regular rate and rhythm. No murmurs/rubs/gallops.  Abdomen: Soft. No guarding/rigidity/tenderness.  : No acute abnormalities  Extremities: Warm. No pedal edema  Skin: Left calf erythema, warmth and tenderness. No wound or abscess.   Vascular: No evidence of phlebitis  Neuro: Alert, oriented to time/place/person  Cranial nerves 2-12 grossly normal. No focal abnormalities      Labs:                        10.9   9.59  )-----------( 251      ( 08 Jul 2022 01:07 )             34.3     07-08    137  |  104  |  17  ----------------------------<  107<H>  3.8   |  23  |  0.76    Ca    8.6      08 Jul 2022 01:07  Mg     2.0     07-08    TPro  7.0  /  Alb  3.9  /  TBili  0.6  /  DBili  x   /  AST  9<L>  /  ALT  12  /  AlkPhos  81  07-08      WBC Trend:  WBC Count: 9.59 (07-08-22 @ 01:07)  WBC Count: 9.20 (07-05-22 @ 15:17)      Auto Neutrophil #: 5.55 K/uL (07-08-22 @ 01:07)  Auto Neutrophil #: 5.69 K/uL (07-05-22 @ 15:17)      Creatine Trend:  Creatinine, Serum: 0.76 (07-08)  Creatinine, Serum: 0.50 (07-05)      Liver Biochemical Testing Trend:  Alanine Aminotransferase (ALT/SGPT): 12 (07-08)  Alanine Aminotransferase (ALT/SGPT): 20 (07-05)  Aspartate Aminotransferase (AST/SGOT): 9 (07-08-22 @ 01:07)  Aspartate Aminotransferase (AST/SGOT): 8 (07-05-22 @ 15:17)  Bilirubin Total, Serum: 0.6 (07-08)  Bilirubin Total, Serum: 0.7 (07-05)      Trend LDH      Auto Eosinophil %: 4.7 % (07-08-22 @ 01:07)  Auto Eosinophil %: 4.5 % (07-05-22 @ 15:17)          MICROBIOLOGY:        Culture - Blood (collected 05 Jul 2022 15:17)  Source: .Blood Blood  Preliminary Report:    No growth to date.    Culture - Blood (collected 05 Jul 2022 15:17)  Source: .Blood Blood  Preliminary Report:    No growth to date.      Lactate, Blood: 0.6 (07-05 @ 15:17)  Sedimentation Rate, Erythrocyte: 34 mm/hr (07-08-22 @ 01:06)    RADIOLOGY:  imaging below personally reviewed    < from: US Duplex Venous Lower Ext Ltd, Left (07.05.22 @ 15:42) >  No evidence of left lower extremity deep venous thrombosis.  < end of copied text >

## 2022-07-08 NOTE — H&P ADULT - NSHPPHYSICALEXAM_GEN_ALL_CORE
Vital Signs Last 24 Hrs  T(C): 36.9 (08 Jul 2022 00:58), Max: 37.1 (07 Jul 2022 19:42)  T(F): 98.4 (08 Jul 2022 00:58), Max: 98.8 (07 Jul 2022 19:42)  HR: 90 (08 Jul 2022 00:58) (89 - 90)  BP: 113/61 (08 Jul 2022 00:58) (113/61 - 133/98)  RR: 18 (08 Jul 2022 00:58) (18 - 18)  SpO2: 100% (08 Jul 2022 00:58) (99% - 100%)    CONSTITUTIONAL: Well-groomed, in no apparent distress  EYES: No conjunctival or scleral injection, non-icteric;   ENMT: No external nasal lesions; MMM  NECK: Trachea midline without palpable neck mass; thyroid not enlarged and non-tender  RESPIRATORY: Breathing comfortably; no dullness to percussion; lungs CTA without wheeze/rhonchi/rales  CARDIOVASCULAR: +S1S2, RRR, no M/G/R; pedal pulses full and symmetric; no lower extremity edema  GASTROINTESTINAL: No palpable masses or tenderness, +BS throughout, no rebound/guarding; no hepatosplenomegaly; no hernia palpated  LYMPHATIC: No cervical LAD or tenderness  SKIN: Well demarcated area of erythema involving the LLE distal to the knee w/ warmth and edema and tenderness to palpation   NEUROLOGIC: CN II-XII intact; sensation intact in LEs b/l to light touch  PSYCHIATRIC: A+O x 3; mood and affect appropriate; appropriate insight and judgment

## 2022-07-08 NOTE — ED ADULT NURSE NOTE - OBJECTIVE STATEMENT
26 y.o. female coming in from home via private car for left lower extremity rash x 2 weeks. pt states she went to an outside hospital a few days ago, was placed on Clindamycin 3 x/day for cellulitis. pt states that her rash worsened over the last 2 days has gotten worse and has extended up her knee. pt states that she has been using Benadryl cream daily x 2 weeks with no relief, pt states she has had an allergic reaction to Benadryl in the past. Her rash started 2 weeks ago localized over the anterior ankle. She denies any fevers, chills. She admits to associated pruritus and pain with the rash. denies PMH. A&Ox3, vitals stable, afebrile, redness noted to left lower extremity on lower portion of leg, no wounds or open sores on left lower extremity. She denies any chest pain, shortness of breath, abdominal pain, nausea, vomiting, diarrhea. She denies any other symptoms at bedside.

## 2022-07-08 NOTE — PATIENT PROFILE ADULT - FALL HARM RISK - RISK INTERVENTIONS
Assistance OOB with selected safe patient handling equipment/Assistance with ambulation/Communicate Fall Risk and Risk Factors to all staff, patient, and family/Discuss with provider need for PT consult/Monitor gait and stability/Reinforce activity limits and safety measures with patient and family/Visual Cue: Yellow wristband/Bed in lowest position, wheels locked, appropriate side rails in place/Call bell, personal items and telephone in reach/Instruct patient to call for assistance before getting out of bed or chair/Non-slip footwear when patient is out of bed/[place] to call system/Physically safe environment - no spills, clutter or unnecessary equipment/Purposeful Proactive Rounding/Room/bathroom lighting operational, light cord in reach

## 2022-07-08 NOTE — H&P ADULT - PROBLEM SELECTOR PLAN 1
- Refractory to oral Clindamycin and c/f allergic reaction to cephalosporin   - Will start Vancomycin q12hrs  - Given antibiotic allergies, not unreasonable to consider ID consult to guide oral therapy for dc

## 2022-07-08 NOTE — PROGRESS NOTE ADULT - ASSESSMENT
Pt is a 25yo F w/ no PMH p/w LLE erythema, edema, warmth and tenderness to palpation x2wks likely i/s/o cellulitis now refractory to oral antibiotics (Clindamycin) and c/f allergic reaction to Cefazolin in the ED.

## 2022-07-08 NOTE — H&P ADULT - NSHPSOCIALHISTORY_GEN_ALL_CORE
- Currently employed in office setting  - Denies tobacco use  - Denies EtOH use  - Denies illicit drug use

## 2022-07-08 NOTE — H&P ADULT - HISTORY OF PRESENT ILLNESS
Pt is a 27yo F w/ no PMH p/w LLE erythema, edema, warmth and tenderness to palpation x2wks. Symptoms initially started as mild redness to the region of her left ankle on 6/18 and then started progressing with some burning/itching. Pt initially applied benadryl cream to her entire leg which worsened the erythema. Associated symptoms at the time included edema and tightness. She then presented to her PMD on 07/05 who advised her to visit the ED for evaluation. There she underwent LLE US which was negative for DVT, and cultures were drawn, which have also been negative to date. She was administered Clindamycin and discharged on oral Clindamycin. Pt then re-presented to the ED on 07/07 due to worsening of symptoms and expansion of area of erythema.    She otherwise denies any F/C, numbness, tingling, trauma to the leg, CP, SOB, hx of DVT/PE, N/V, calf pain or other symptoms. No recent travel or outdoor activities.    In this ED visit she was administered Ancef given pen allergy after which she developed pruritis throughout her body.

## 2022-07-08 NOTE — H&P ADULT - ASSESSMENT
Pt is a 27yo F w/ no PMH p/w LLE erythema, edema, warmth and tenderness to palpation x2wks likely i/s/o cellulitis now refractory to oral antibiotics (Clindamycin) and c/f allergic reaction to Cefazolin in the ED.

## 2022-07-08 NOTE — CONSULT NOTE ADULT - ATTENDING COMMENTS
Unilateral primary osteoarthritis, right hip Ms Rosado is a 27yo F w/ no PMH p/w LLE erythema, edema, warmth and tenderness to palpation x2wks. Symptoms initially started as mild redness to the region of her left ankle on 6/18 and then started progressing with some itching up her calf. As it started to worsen pt applied benadryl cream to her entire leg which she feels may have worsened the erythema. The redness continued to worsen and was associated with pain and swelling and she then presented to her PMD on 07/05 who advised her to visit the [facility] ED for evaluation. She was administered Clindamycin and discharged on oral Clindamycin. Pt then re-presented to the ED on 07/07 due to worsening of symptoms and expansion of area of erythema. ID consulted for the same.     WORKUP  Culture - Blood (7/5):   No growth to date.  Sedimentation Rate, Erythrocyte: 34 mm/hr (07-08-22 @ 01:06)  US Duplex Venous Lower Ext Ltd, Left (07.05): No evidence of left lower extremity deep venous thrombosis.  Blood cx (7/5): NGTD -> in HIE  I suspect that patient has a contact dermatitis /? bite and then an allergic rash to the benadryl and then a possible secondary infection  note scaley rash on leg and on toughing area on other leg      DIAGNOSIS and IMPRESSION  ?contact dermatitis   Cellulitis  Penicillin and Cephalosporin allergy (itching, rash)  Afebrile with no leukocytosis  Failed outpt Clindamycin x 2 days  s/p Ancef in ED: She developed pruritis and hives throughout her body.  s/p Vanc in ED    RECOMMENDATIONS  c/w vanc for now -> Increase dose t o1gm Q8  Please check Vancomycin trough before 4th sequential dose. Target trough levels 15-20    Given significant itching, atypical matching pink rash on other leg recommend derm eval- ? topical steroids if indeed is contact dermatitis   Monitor for fevers and trend WBC    Alia Al M.D. ,   please reach via teams   If no answer, or after 5PM/ weekends,  then please call  [phone]    Assessment and plan discussed with the primary team .    ID service will be covering over the weekend. Please call for acute issues or questions. [phone]

## 2022-07-08 NOTE — ED PROVIDER NOTE - OBJECTIVE STATEMENT
26-year-old female with no reported past medical history presents to the ED with a rash over the left lower extremity over the last 2 weeks. Was seen at outside hospital couple of days ago and started on Clindamycin po. Her rash subsequently worsened over the last 2 days now extending up into her knee. Her rash started 2 weeks ago localized over the anterior ankle. She denies any fevers, chills . She attempted to use topical Benadryl with no relief. She admits to associated pruritus and pain with the rash. She denies any chest pain, shortness of breath, abdominal pain, nausea, vomiting, diarrhea. She denies any other symptoms at bedside.

## 2022-07-08 NOTE — ED ADULT NURSE NOTE - NSIMPLEMENTINTERV_GEN_ALL_ED
Implemented All Universal Safety Interventions:  [place] to call system. Call bell, personal items and telephone within reach. Instruct patient to call for assistance. Room bathroom lighting operational. Non-slip footwear when patient is off stretcher. Physically safe environment: no spills, clutter or unnecessary equipment. Stretcher in lowest position, wheels locked, appropriate side rails in place.

## 2022-07-08 NOTE — CHART NOTE - NSCHARTNOTEFT_GEN_A_CORE
25yo F w/ no PMH p/w LLE erythema, edema, warmth and tenderness to palpation and itch x2wks. Started as mild redness to the region of her left ankle on 6/18 and then started progressing with some itching up her calf. Dermatology consulted for itchy rash. Patient notes that she had small bumps prior to onset of the rash that she wiped with alcohol pads. As it started to worsen pt applied benadryl cream to her entire leg which she feels may have worsened the erythema. The redness continued to worsen and was associated with pain and swelling and she then presented to her PMD on 07/05 who advised her to visit the ED for evaluation. She went to [facility] and there she underwent LLE US which was negative for DVT, and cultures were drawn, which are negative to date. She was administered IVClindamycin and discharged on oral Clindamycin.  Pt then re-presented to the ED on 07/07 due to worsening of symptoms and expansion of area of erythema. Here pt is afebrile with no leukocytosis. In this ED visit she was administered Ancef given penicillin allergy after which she developed pruritis and hives throughout her body. She otherwise denies any F/C, numbness, tingling, trauma to the leg, CP, SOB, hx of DVT/PE, N/V, calf pain or other symptoms. No recent travel or outdoor activities. No abnormal food, including no recent sea food. No recent exposure to fresh or sea water. Pt has a pet dog, but no recent bites or scratches.    ID currently treating with Vancomycin since this morning and recommend dermatology consult due to significant itching and a well demarcated red scaly patch on the R medial LE, to which patient applied OTC poison ivy spray.  No hx of previous/similar rash    Vital Signs Last 24 Hrs  T(C): 36.6 (08 Jul 2022 16:30), Max: 37.1 (07 Jul 2022 19:42)  T(F): 97.9 (08 Jul 2022 16:30), Max: 98.8 (07 Jul 2022 19:42)  HR: 77 (08 Jul 2022 16:30) (77 - 90)  BP: 119/85 (08 Jul 2022 16:30) (95/65 - 133/98)  BP(mean): --  RR: 18 (08 Jul 2022 16:30) (16 - 18)  SpO2: 100% (08 Jul 2022 16:30) (98% - 100%)    Parameters below as of 08 Jul 2022 16:30  Patient On (Oxygen Delivery Method): room air    Exam:  Red- brown plaque around the LLE, more red proximally and brown distally with superficial desquamation at distal lower extremity. Cool to touch due to patient icing her leg, however the most superior aspect of her anterior LE is warm  RLE with geometric thin red scaly plaque                            10.9   9.59  )-----------( 251      ( 08 Jul 2022 01:07 )             34.3   07-08    137  |  104  |  17  ----------------------------<  107<H>  3.8   |  23  |  0.76    Ca    8.6      08 Jul 2022 01:07  Mg     2.0     07-08    TPro  7.0  /  Alb  3.9  /  TBili  0.6  /  DBili  x   /  AST  9<L>  /  ALT  12  /  AlkPhos  81  07-08    Blood cx 7/5 prelim NGTD      Differential favors an acute spongiotic process, with elements of resolution towards the distal leg (as suggested by scale) this could include a contact dermatitis to one of her OTC products that she was applying. Low suspicion for cellulitis given her pruritus, and the absence of fever and leukocytosis  At this time recommend  - apply clobetasol propionate 0.05% ointment BID to AA, under occlusion (i.e. saran wrap) x 2 weeks  - STOP OTC benadryl cream and poison ivy spray  - Recommend outpatient follow up at our clinic located at [address] [place] [address], [address], [place] ([phone])  -remainder of care per ID and primary team.    Patient was seen at bedside and discussed remotely with the dermatology attending Dr. Price.  Recommendations were communicated with the primary team.  Please page [phone] for further related questions.    Angela Velasquez MD  Resident Physician, PGY2  [facility]  Pager: [phone]  Office: [phone]

## 2022-07-09 LAB
ANION GAP SERPL CALC-SCNC: 11 MMOL/L — SIGNIFICANT CHANGE UP (ref 5–17)
BUN SERPL-MCNC: 12 MG/DL — SIGNIFICANT CHANGE UP (ref 7–23)
CALCIUM SERPL-MCNC: 8.8 MG/DL — SIGNIFICANT CHANGE UP (ref 8.4–10.5)
CHLORIDE SERPL-SCNC: 105 MMOL/L — SIGNIFICANT CHANGE UP (ref 96–108)
CO2 SERPL-SCNC: 23 MMOL/L — SIGNIFICANT CHANGE UP (ref 22–31)
CREAT SERPL-MCNC: 0.49 MG/DL — LOW (ref 0.5–1.3)
EGFR: 134 ML/MIN/1.73M2 — SIGNIFICANT CHANGE UP
GLUCOSE SERPL-MCNC: 95 MG/DL — SIGNIFICANT CHANGE UP (ref 70–99)
HCT VFR BLD CALC: 34.7 % — SIGNIFICANT CHANGE UP (ref 34.5–45)
HGB BLD-MCNC: 11.4 G/DL — LOW (ref 11.5–15.5)
MCHC RBC-ENTMCNC: 28.4 PG — SIGNIFICANT CHANGE UP (ref 27–34)
MCHC RBC-ENTMCNC: 32.9 GM/DL — SIGNIFICANT CHANGE UP (ref 32–36)
MCV RBC AUTO: 86.5 FL — SIGNIFICANT CHANGE UP (ref 80–100)
MRSA PCR RESULT.: SIGNIFICANT CHANGE UP
NRBC # BLD: 0 /100 WBCS — SIGNIFICANT CHANGE UP (ref 0–0)
PLATELET # BLD AUTO: 259 K/UL — SIGNIFICANT CHANGE UP (ref 150–400)
POTASSIUM SERPL-MCNC: 3.9 MMOL/L — SIGNIFICANT CHANGE UP (ref 3.5–5.3)
POTASSIUM SERPL-SCNC: 3.9 MMOL/L — SIGNIFICANT CHANGE UP (ref 3.5–5.3)
RBC # BLD: 4.01 M/UL — SIGNIFICANT CHANGE UP (ref 3.8–5.2)
RBC # FLD: 13.4 % — SIGNIFICANT CHANGE UP (ref 10.3–14.5)
S AUREUS DNA NOSE QL NAA+PROBE: DETECTED
SODIUM SERPL-SCNC: 138 MMOL/L — SIGNIFICANT CHANGE UP (ref 135–145)
VANCOMYCIN TROUGH SERPL-MCNC: 5.3 UG/ML — LOW (ref 10–20)
WBC # BLD: 7.61 K/UL — SIGNIFICANT CHANGE UP (ref 3.8–10.5)
WBC # FLD AUTO: 7.61 K/UL — SIGNIFICANT CHANGE UP (ref 3.8–10.5)

## 2022-07-09 PROCEDURE — 99233 SBSQ HOSP IP/OBS HIGH 50: CPT

## 2022-07-09 RX ADMIN — CHLORHEXIDINE GLUCONATE 1 APPLICATION(S): 213 SOLUTION TOPICAL at 11:46

## 2022-07-09 RX ADMIN — Medication 250 MILLIGRAM(S): at 06:48

## 2022-07-09 RX ADMIN — ENOXAPARIN SODIUM 40 MILLIGRAM(S): 100 INJECTION SUBCUTANEOUS at 06:47

## 2022-07-09 RX ADMIN — Medication 250 MILLIGRAM(S): at 14:16

## 2022-07-09 RX ADMIN — Medication 1 APPLICATION(S): at 06:47

## 2022-07-09 RX ADMIN — Medication 1 APPLICATION(S): at 17:22

## 2022-07-09 NOTE — PROVIDER CONTACT NOTE (OTHER) - ACTION/TREATMENT ORDERED:
Do not draw vanco troph. Do not administer vancomycin - she said ID is going to officially d/c the vanco in the AM. Russell as not done for now.

## 2022-07-09 NOTE — CHART NOTE - NSCHARTNOTEFT_GEN_A_CORE
Patient is a 26y old  Female who presents with a chief complaint of Cellulitis (09 Jul 2022 14:57)    Notified by RN that patient was complaining of RUE pruritis with associated urticaria.  Seen and assessed patient and in no acute distress. Patient's mother was also present at bedside.  Patient stated that she has been receiving Lovenox injections to the right upper extremity during hospital admission.        Vital Signs Last 24 Hrs  T(C): 37.1 (09 Jul 2022 16:08), Max: 37.1 (09 Jul 2022 16:08)  T(F): 98.8 (09 Jul 2022 16:08), Max: 98.8 (09 Jul 2022 16:08)  HR: 82 (09 Jul 2022 16:08) (72 - 82)  BP: 130/73 (09 Jul 2022 16:08) (106/68 - 130/73)  BP(mean): --  RR: 18 (09 Jul 2022 16:08) (18 - 18)  SpO2: 98% (09 Jul 2022 16:08) (98% - 99%)    Parameters below as of 09 Jul 2022 16:08  Patient On (Oxygen Delivery Method): room air          Labs:                          11.4   7.61  )-----------( 259      ( 09 Jul 2022 07:14 )             34.7     07-09    138  |  105  |  12  ----------------------------<  95  3.9   |  23  |  0.49<L>    Ca    8.8      09 Jul 2022 07:11  Mg     2.0     07-08    TPro  7.0  /  Alb  3.9  /  TBili  0.6  /  DBili  x   /  AST  9<L>  /  ALT  12  /  AlkPhos  81  07-08        Radiology:    Physical Exam:  General: WN/WD NAD  Neurology: A&Ox3, nonfocal, SIMPSON x 4  Head:  Normocephalic, atraumatic  Respiratory: CTA B/L  CV: RRR, S1S2, no murmur  Abdominal: Soft, NT, ND no palpable mass  MSK: No edema, + peripheral pulses, FROM all 4 extremity    Assessment & Plan:  HPI:  Pt is a 25yo F w/ no PMH p/w LLE erythema, edema, warmth and tenderness to palpation x2wks. Symptoms initially started as mild redness to the region of her left ankle on 6/18 and then started progressing with some burning/itching. Pt initially applied benadryl cream to her entire leg which worsened the erythema. Associated symptoms at the time included edema and tightness. She then presented to her PMD on 07/05 who advised her to visit the ED for evaluation. There she underwent LLE US which was negative for DVT, and cultures were drawn, which have also been negative to date. She was administered Clindamycin and discharged on oral Clindamycin. Pt then re-presented to the ED on 07/07 due to worsening of symptoms and expansion of area of erythema.    She otherwise denies any F/C, numbness, tingling, trauma to the leg, CP, SOB, hx of DVT/PE, N/V, calf pain or other symptoms. No recent travel or outdoor activities.    In this ED visit she was administered Ancef given pen allergy after which she developed pruritis throughout her body. (08 Jul 2022 05:03)    >  >  >  >        Follow up with Attending in AM.      Sandi Martinez PA-C  Department of Medicine  Spectra Patient is a 26y old  Female who presents with a chief complaint of Cellulitis (09 Jul 2022 14:57)    Notified by RN that patient was complaining of pruritis with associated urticaria to the RUE deltoid region.  Seen and assessed patient and in no acute distress. Patient's mother was also present at bedside.  Patient stated that she has been receiving Lovenox injections to the right upper extremity during hospital admission.  She states since she received her injection in the AM today, she has developed pruritis to the affected area.  Denies fever, chills, SOB, anaphylaxis, or erythema to the affected area.      Vital Signs Last 24 Hrs  T(C): 37.1 (09 Jul 2022 16:08), Max: 37.1 (09 Jul 2022 16:08)  T(F): 98.8 (09 Jul 2022 16:08), Max: 98.8 (09 Jul 2022 16:08)  HR: 82 (09 Jul 2022 16:08) (72 - 82)  BP: 130/73 (09 Jul 2022 16:08) (106/68 - 130/73)  BP(mean): --  RR: 18 (09 Jul 2022 16:08) (18 - 18)  SpO2: 98% (09 Jul 2022 16:08) (98% - 99%)    Parameters below as of 09 Jul 2022 16:08  Patient On (Oxygen Delivery Method): room air          Labs:                          11.4   7.61  )-----------( 259      ( 09 Jul 2022 07:14 )             34.7     07-09    138  |  105  |  12  ----------------------------<  95  3.9   |  23  |  0.49<L>    Ca    8.8      09 Jul 2022 07:11  Mg     2.0     07-08    TPro  7.0  /  Alb  3.9  /  TBili  0.6  /  DBili  x   /  AST  9<L>  /  ALT  12  /  AlkPhos  81  07-08        Radiology:  < from: US Duplex Venous Lower Ext Ltd, Left (07.05.22 @ 15:42) >    IMPRESSION:  No evidence of left lower extremity deep venous thrombosis.      Physical Exam:  General: WN/WD NAD  Skin: LLE with mild erythema and warmth to palpation; area of demarcation within limits. RUE shows ecchymosis to lateral area of lovenox injection site with associated urticaria, nonerythematous and slightly warm to palpation, nonedematous.  Neurology: A&Ox3, nonfocal, SIMPSON x 4  Head:  Normocephalic, atraumatic  Respiratory: CTA B/L  CV: RRR, S1S2, no murmur  Abdominal: Soft, NT, ND no palpable mass  MSK: No edema, + peripheral pulses, FROM all 4 extremity    Assessment & Plan:  HPI:  Pt is a 25yo F w/ no PMH p/w LLE erythema, edema, warmth and tenderness to palpation x2wks. Symptoms initially started as mild redness to the region of her left ankle on 6/18 and then started progressing with some burning/itching. Pt initially applied benadryl cream to her entire leg which worsened the erythema. Associated symptoms at the time included edema and tightness. She then presented to her PMD on 07/05 who advised her to visit the ED for evaluation. There she underwent LLE US which was negative for DVT, and cultures were drawn, which have also been negative to date. She was administered Clindamycin and discharged on oral Clindamycin. Pt then re-presented to the ED on 07/07 due to worsening of symptoms and expansion of area of erythema.    She otherwise denies any F/C, numbness, tingling, trauma to the leg, CP, SOB, hx of DVT/PE, N/V, calf pain or other symptoms. No recent travel or outdoor activities.    In this ED visit she was administered Ancef given pen allergy after which she developed pruritis throughout her body. (08 Jul 2022 05:03)      Now presents with urticaria to the RUE.      1. Urticaria - likely 2/2 lovenox injection  - Lovenox d/c'ed as patient is low risk for DVT ppx. LLE negative for DVT on 7/5/2022 (see above).  - Apply clobetasol to newly affected area (derm following and cream is being applied to LLE), as this is likely contact dermatitis.  - ID to follow up in AM to d/c IV Vancomycin, as this is not a bacterial etiology.  - Continue to monitor for any signs concerning for anaphylaxis.  - Case discussed with [facility] (Dr. Mohr) and is aware and in agreement with plan.  - Patient understands the plan is agreeable.  - Discussed with SAMPSON Sewell.  - Will endorse to the primary team in the AM.      Sandi Martinez PA-C  Department of Medicine  [facility] [id number] Patient is a 26y old  Female who presents with a chief complaint of Cellulitis (09 Jul 2022 14:57)    Notified by RN that patient was complaining of pruritis with associated urticaria to the RUE deltoid region.  Seen and assessed patient and in no acute distress. Patient's mother was also present at bedside.  Patient stated that she has been receiving Lovenox injections to the right upper extremity during hospital admission.  She states since she received her injection in the AM today, she has developed pruritis to the affected area.  Denies fever, chills, SOB, anaphylaxis, or erythema to the affected area.      Vital Signs Last 24 Hrs  T(C): 37.1 (09 Jul 2022 16:08), Max: 37.1 (09 Jul 2022 16:08)  T(F): 98.8 (09 Jul 2022 16:08), Max: 98.8 (09 Jul 2022 16:08)  HR: 82 (09 Jul 2022 16:08) (72 - 82)  BP: 130/73 (09 Jul 2022 16:08) (106/68 - 130/73)  BP(mean): --  RR: 18 (09 Jul 2022 16:08) (18 - 18)  SpO2: 98% (09 Jul 2022 16:08) (98% - 99%)    Parameters below as of 09 Jul 2022 16:08  Patient On (Oxygen Delivery Method): room air          Labs:                          11.4   7.61  )-----------( 259      ( 09 Jul 2022 07:14 )             34.7     07-09    138  |  105  |  12  ----------------------------<  95  3.9   |  23  |  0.49<L>    Ca    8.8      09 Jul 2022 07:11  Mg     2.0     07-08    TPro  7.0  /  Alb  3.9  /  TBili  0.6  /  DBili  x   /  AST  9<L>  /  ALT  12  /  AlkPhos  81  07-08        Radiology:  < from: US Duplex Venous Lower Ext Ltd, Left (07.05.22 @ 15:42) >    IMPRESSION:  No evidence of left lower extremity deep venous thrombosis.      Physical Exam:  General: WN/WD NAD  Skin: LLE with mild erythema and warmth to palpation; area of demarcation within limits. RUE shows ecchymosis to lateral area of lovenox injection site with associated urticaria, nonerythematous and slightly warm to palpation, nonedematous.  Neurology: A&Ox3, nonfocal, SIMPSON x 4  Head:  Normocephalic, atraumatic  Respiratory: CTA B/L  CV: RRR, S1S2, no murmur  Abdominal: Soft, NT, ND no palpable mass  MSK: No edema, + peripheral pulses, FROM all 4 extremity    Assessment & Plan:  HPI:  Pt is a 27yo F w/ no PMH p/w LLE erythema, edema, warmth and tenderness to palpation x2wks. Symptoms initially started as mild redness to the region of her left ankle on 6/18 and then started progressing with some burning/itching. Pt initially applied benadryl cream to her entire leg which worsened the erythema. Associated symptoms at the time included edema and tightness. She then presented to her PMD on 07/05 who advised her to visit the ED for evaluation. There she underwent LLE US which was negative for DVT, and cultures were drawn, which have also been negative to date. She was administered Clindamycin and discharged on oral Clindamycin. Pt then re-presented to the ED on 07/07 due to worsening of symptoms and expansion of area of erythema.    She otherwise denies any F/C, numbness, tingling, trauma to the leg, CP, SOB, hx of DVT/PE, N/V, calf pain or other symptoms. No recent travel or outdoor activities.    In this ED visit she was administered Ancef given pen allergy after which she developed pruritis throughout her body. (08 Jul 2022 05:03)      Now presents with urticaria to the RUE.      1. Urticaria - likely 2/2 lovenox injection  - Lovenox d/c'ed as patient is low risk for DVT ppx, as she ambulates regularly with no issues/impairments. LLE negative for DVT on 7/5/2022 (see above).  - Apply clobetasol to newly affected area (derm following and cream is being applied to LLE), as this is likely contact dermatitis.  - ID to follow up in AM to d/c IV Vancomycin, as this is not a bacterial etiology.  - Continue to monitor for any signs concerning for anaphylaxis.  - Case discussed with [facility] (Dr. Mohr) and is aware and in agreement with plan.  - Patient understands the plan is agreeable.  - Discussed with SAMPSON Sewell.  - Will endorse to the primary team in the AM.      Sandi Martinez PA-C  Department of Medicine  [facility]ra [id number] Patient is a 26y old  Female who presents with a chief complaint of Cellulitis (09 Jul 2022 14:57)    Notified by RN that patient was complaining of pruritis with associated urticaria to the RUE deltoid region.  Seen and assessed patient and in no acute distress. Patient's mother was also present at bedside.  Patient stated that she has been receiving Lovenox injections to the right upper extremity during hospital admission.  She states since she received her injection in the AM today, she has developed pruritis to the affected area.  Denies fever, chills, SOB, anaphylaxis, or erythema to the affected area.      Vital Signs Last 24 Hrs  T(C): 37.1 (09 Jul 2022 16:08), Max: 37.1 (09 Jul 2022 16:08)  T(F): 98.8 (09 Jul 2022 16:08), Max: 98.8 (09 Jul 2022 16:08)  HR: 82 (09 Jul 2022 16:08) (72 - 82)  BP: 130/73 (09 Jul 2022 16:08) (106/68 - 130/73)  BP(mean): --  RR: 18 (09 Jul 2022 16:08) (18 - 18)  SpO2: 98% (09 Jul 2022 16:08) (98% - 99%)    Parameters below as of 09 Jul 2022 16:08  Patient On (Oxygen Delivery Method): room air          Labs:                          11.4   7.61  )-----------( 259      ( 09 Jul 2022 07:14 )             34.7     07-09    138  |  105  |  12  ----------------------------<  95  3.9   |  23  |  0.49<L>    Ca    8.8      09 Jul 2022 07:11  Mg     2.0     07-08    TPro  7.0  /  Alb  3.9  /  TBili  0.6  /  DBili  x   /  AST  9<L>  /  ALT  12  /  AlkPhos  81  07-08        Radiology:  < from: US Duplex Venous Lower Ext Ltd, Left (07.05.22 @ 15:42) >    IMPRESSION:  No evidence of left lower extremity deep venous thrombosis.      Physical Exam:  General: WN/WD NAD  Skin: LLE with mild erythema and warmth to palpation; area of demarcation within limits. RUE shows ecchymosis to lateral area of lovenox injection site with associated urticaria, nonerythematous and slightly warm to palpation, nonedematous.  Neurology: A&Ox3, nonfocal, SIMPSON x 4  Head:  Normocephalic, atraumatic  Respiratory: CTA B/L  CV: RRR, S1S2, no murmur  Abdominal: Soft, NT, ND no palpable mass  MSK: No edema, + peripheral pulses, FROM all 4 extremity    Assessment & Plan:  HPI:  Pt is a 25yo F w/ no PMH p/w LLE erythema, edema, warmth and tenderness to palpation x2wks. Symptoms initially started as mild redness to the region of her left ankle on 6/18 and then started progressing with some burning/itching. Pt initially applied benadryl cream to her entire leg which worsened the erythema. Associated symptoms at the time included edema and tightness. She then presented to her PMD on 07/05 who advised her to visit the ED for evaluation. There she underwent LLE US which was negative for DVT, and cultures were drawn, which have also been negative to date. She was administered Clindamycin and discharged on oral Clindamycin. Pt then re-presented to the ED on 07/07 due to worsening of symptoms and expansion of area of erythema.    She otherwise denies any F/C, numbness, tingling, trauma to the leg, CP, SOB, hx of DVT/PE, N/V, calf pain or other symptoms. No recent travel or outdoor activities.    In this ED visit she was administered Ancef given pen allergy after which she developed pruritis throughout her body. (08 Jul 2022 05:03)      Now presents with urticaria to the RUE.      1. Urticaria - likely 2/2 lovenox injection  - Lovenox d/c'ed as patient is low risk for DVT ppx, as she ambulates regularly with no issues/impairments. LLE negative for DVT on 7/5/2022 (see above).  - Apply clobetasol and cold compresses to newly affected area (derm following and cream is being applied to LLE), as this is likely contact dermatitis.  - ID to follow up in AM to d/c IV Vancomycin, as this is not a bacterial etiology.  - Continue to monitor for any signs concerning for anaphylaxis.  - Case discussed with [facility] (Dr. Mohr) and is aware and in agreement with plan.  - Patient understands the plan is agreeable.  - Discussed with SAMPSON Sewell.  - Will endorse to the primary team in the AM.      GUICHO JoC  Department of Medicine  [facility]ra [id number]

## 2022-07-09 NOTE — PROVIDER CONTACT NOTE (OTHER) - SITUATION
An order for a stat vanco troph was ordered - pt refused to be drawn and questioned why this was ordered.

## 2022-07-10 PROCEDURE — 99232 SBSQ HOSP IP/OBS MODERATE 35: CPT

## 2022-07-10 PROCEDURE — 99233 SBSQ HOSP IP/OBS HIGH 50: CPT

## 2022-07-10 RX ADMIN — Medication 1 APPLICATION(S): at 17:44

## 2022-07-10 RX ADMIN — CHLORHEXIDINE GLUCONATE 1 APPLICATION(S): 213 SOLUTION TOPICAL at 12:08

## 2022-07-10 RX ADMIN — Medication 1 APPLICATION(S): at 07:25

## 2022-07-10 NOTE — PROVIDER CONTACT NOTE (OTHER) - BACKGROUND
Admitted for cellulitis of LLE.
Admitted for cellulitis. Receiving vanco q 8 hours. Last troph level was 5.3 this morning.

## 2022-07-10 NOTE — PROVIDER CONTACT NOTE (OTHER) - ASSESSMENT
A&Ox4. VSS. New rash on R arm tricep. Red with hives. About 3 x 6 inches. Pt states it is itchy and not hurt.

## 2022-07-10 NOTE — PROGRESS NOTE ADULT - ASSESSMENT
Ms Rosado is a 27yo F w/ no PMH p/w LLE erythema, edema, warmth and tenderness to palpation x2wks. Symptoms initially started as mild redness to the region of her left ankle on 6/18 and then started progressing with some itching up her calf. As it started to worsen pt applied benadryl cream to her entire leg which she feels may have worsened the erythema. The redness continued to worsen and was associated with pain and swelling and she then presented to her PMD on 07/05 who advised her to visit the [facility] ED for evaluation. She was administered Clindamycin and discharged on oral Clindamycin. Pt then re-presented to the ED on 07/07 due to worsening of symptoms and expansion of area of erythema. ID consulted for the same.     WORKUP  Culture - Blood (7/5):   No growth to date.  Sedimentation Rate, Erythrocyte: 34 mm/hr (07-08-22 @ 01:06)  US Duplex Venous Lower Ext Ltd, Left (07.05): No evidence of left lower extremity deep venous thrombosis.  Blood cx (7/5): NGTD -> in HIE    DIAGNOSIS and IMPRESSION  ·	Cellulitis  ·	Penicillin and Cephalosporin allergy (itching, rash), clinically failed clindamycin in OPD  Afebrile with no leukocytosis  Failed outpt Clindamycin x 2 days  s/p Ancef in ED: She developed pruritis and hives throughout her body.  s/p Vanc in ED, continued on floor  7/9, patient devloped pruritis and rash, ? hives after receiving lovenox  Team evaluation felt that rashes were allergic and that she did not have cellulitis  Vanco was held and need for further antibiotics questioned.    RECOMMENDATIONS  S/P vanc, had received 3 doses  Afebrile with no leucocytosis  Suspect that patient has a contact dermatitis /? bite and then an allergic rash to the benadryl   Given significant itching, atypical matching pink rash on other leg recommend derm eval- ? topical steroids if indeed is contact dermatitis   Pt seen and examined Case d/w attending and primary team    Felix Umaña MD  pager [phone]  office [phone]  Over the weekends please call [phone]   Ms Rosado is a 25yo F w/ no PMH p/w LLE erythema, edema, warmth and tenderness to palpation x2wks. Symptoms initially started as mild redness to the region of her left ankle on 6/18 and then started progressing with some itching up her calf. As it started to worsen pt applied benadryl cream to her entire leg which she feels may have worsened the erythema. The redness continued to worsen and was associated with pain and swelling and she then presented to her PMD on 07/05 who advised her to visit the [facility] ED for evaluation. She was administered Clindamycin and discharged on oral Clindamycin. Pt then re-presented to the ED on 07/07 due to worsening of symptoms and expansion of area of erythema. ID consulted for the same.     Rash started as 4 spots on left ankle at end of her jeans.  Was pruritic from the start.  Was scratching it  Developed into blisters with surrounding erythema.  She and mother thought it was poison ivy. Photos C/W contact dermatitis, her dog runs everywhere and might have been source.  Applied copious benadryl to leg and next morning rash was confluent and even more itchy  Notes "fluid" draining from blisters  She also noted some itching on the right leg- sprayed that with anti-itch cream/alcohol and it resolved  Saw PMD- marked it and sent to ED. Given IV clinda then oral- took 4 doses.  Not better- to ER  Now rash is fading. Pruritis at margins persists. Blisters resolved    WORKUP  Culture - Blood (7/5):   No growth to date.  Sedimentation Rate, Erythrocyte: 34 mm/hr (07-08-22 @ 01:06)  US Duplex Venous Lower Ext Ltd, Left (07.05): No evidence of left lower extremity deep venous thrombosis.  Blood cx (7/5): NGTD -> in HIE    DIAGNOSIS and IMPRESSION  ·	Cellulitis  ·	Penicillin and Cephalosporin allergy (itching, rash), clinically failed clindamycin in OPD  Afebrile with no leukocytosis  Failed outpt Clindamycin x 2 days  s/p Ancef in ED: She developed pruritis and hives throughout her body.  s/p Vanc in ED, continued on floor  7/9, patient developed pruritis and rash, ? hives after receiving lovenox  Team evaluation felt that rashes were allergic and that she did not have cellulitis  Vanco was held and need for further antibiotics questioned.    RECOMMENDATIONS  S/P vanc, had received 3 doses  Afebrile with no leucocytosis  Suspect that patient has a contact dermatitis and then an allergic rash to the benadryl   Given significant itching, atypical matching pink rash on other leg recommend derm eval- ? topical steroids if indeed is contact dermatitis   Pt seen and examined Case d/w attending and primary team  At this point with atypical features and story consistent with contact dermatitis. Gradual improvement. Can hold antibiotics and observe.     Felix Umaña MD  pager [phone]  office [phone]  Over the weekends please call [phone]   Ms Rosado is a 27yo F w/ no PMH p/w LLE erythema, edema, warmth and tenderness to palpation x2wks. Symptoms initially started as mild redness to the region of her left ankle on 6/18 and then started progressing with some itching up her calf. As it started to worsen pt applied benadryl cream to her entire leg which she feels may have worsened the erythema. The redness continued to worsen and was associated with pain and swelling and she then presented to her PMD on 07/05 who advised her to visit the [facility] ED for evaluation. She was administered Clindamycin and discharged on oral Clindamycin. Pt then re-presented to the ED on 07/07 due to worsening of symptoms and expansion of area of erythema. ID consulted for the same.     Rash started as 4 spots on left ankle at end of her jeans.  Was pruritic from the start.  Was scratching it  Developed into blisters with surrounding erythema.  She and mother thought it was poison ivy. Photos C/W contact dermatitis, her dog runs everywhere and might have been source.  Applied copious benadryl to leg and next morning rash was confluent and even more itchy  Notes "fluid" draining from blisters  She also noted some itching on the right leg- sprayed that with anti-itch cream/alcohol and it resolved  Saw PMD- marked it and sent to ED. Given IV clinda then oral- took 4 doses.  Not better- to ER  Now rash is fading. Pruritis at margins persists. Blisters resolved    WORKUP  Culture - Blood (7/5):   No growth to date.  Sedimentation Rate, Erythrocyte: 34 mm/hr (07-08-22 @ 01:06)  US Duplex Venous Lower Ext Ltd, Left (07.05): No evidence of left lower extremity deep venous thrombosis.  Blood cx (7/5): NGTD -> in HIE    DIAGNOSIS and IMPRESSION  ·	Cellulitis  ·	Penicillin and Cephalosporin allergy (itching, rash), clinically failed clindamycin in OPD  Afebrile with no leukocytosis  Failed outpt Clindamycin x 2 days  s/p Ancef in ED: She developed pruritis and hives throughout her body.  s/p Vanc in ED, continued on floor  7/9, patient developed pruritis and rash, ? hives after receiving lovenox  Team evaluation felt that rashes were allergic and that she did not have cellulitis  Vanco was held and need for further antibiotics questioned.    RECOMMENDATIONS  S/P vanc, had received 3 doses  Afebrile with no leucocytosis  Suspect that patient has a contact dermatitis and then an allergic rash to the benadryl   Given significant itching, atypical matching pink rash on other leg recommend derm eval- ? topical steroids if indeed is contact dermatitis   Pt seen and examined Case d/w attending and primary team  At this point with atypical features and story consistent with contact dermatitis. Gradual improvement. Can hold antibiotics and observe.   Discussed with covering NP    Felix Umaña MD  pager [phone]  office [phone]  Over the weekends please call [phone]

## 2022-07-11 ENCOUNTER — TRANSCRIPTION ENCOUNTER (OUTPATIENT)
Age: 27
End: 2022-07-11

## 2022-07-11 VITALS
OXYGEN SATURATION: 99 % | DIASTOLIC BLOOD PRESSURE: 69 MMHG | SYSTOLIC BLOOD PRESSURE: 111 MMHG | HEART RATE: 75 BPM | TEMPERATURE: 99 F | RESPIRATION RATE: 18 BRPM

## 2022-07-11 LAB
CULTURE RESULTS: SIGNIFICANT CHANGE UP
CULTURE RESULTS: SIGNIFICANT CHANGE UP
SPECIMEN SOURCE: SIGNIFICANT CHANGE UP
SPECIMEN SOURCE: SIGNIFICANT CHANGE UP

## 2022-07-11 PROCEDURE — 87040 BLOOD CULTURE FOR BACTERIA: CPT

## 2022-07-11 PROCEDURE — 85652 RBC SED RATE AUTOMATED: CPT

## 2022-07-11 PROCEDURE — 96375 TX/PRO/DX INJ NEW DRUG ADDON: CPT

## 2022-07-11 PROCEDURE — 87640 STAPH A DNA AMP PROBE: CPT

## 2022-07-11 PROCEDURE — 82550 ASSAY OF CK (CPK): CPT

## 2022-07-11 PROCEDURE — 96374 THER/PROPH/DIAG INJ IV PUSH: CPT

## 2022-07-11 PROCEDURE — 99285 EMERGENCY DEPT VISIT HI MDM: CPT

## 2022-07-11 PROCEDURE — 85027 COMPLETE CBC AUTOMATED: CPT

## 2022-07-11 PROCEDURE — 87641 MR-STAPH DNA AMP PROBE: CPT

## 2022-07-11 PROCEDURE — 87637 SARSCOV2&INF A&B&RSV AMP PRB: CPT

## 2022-07-11 PROCEDURE — 99239 HOSP IP/OBS DSCHRG MGMT >30: CPT

## 2022-07-11 PROCEDURE — 85025 COMPLETE CBC W/AUTO DIFF WBC: CPT

## 2022-07-11 PROCEDURE — 80053 COMPREHEN METABOLIC PANEL: CPT

## 2022-07-11 PROCEDURE — 80048 BASIC METABOLIC PNL TOTAL CA: CPT

## 2022-07-11 PROCEDURE — 80202 ASSAY OF VANCOMYCIN: CPT

## 2022-07-11 PROCEDURE — 83735 ASSAY OF MAGNESIUM: CPT

## 2022-07-11 RX ORDER — LORATADINE 10 MG/1
1 TABLET ORAL
Qty: 0 | Refills: 0 | DISCHARGE
Start: 2022-07-11

## 2022-07-11 RX ADMIN — Medication 1 APPLICATION(S): at 06:37

## 2022-07-11 NOTE — DISCHARGE NOTE PROVIDER - NSDCCPCAREPLAN_GEN_ALL_CORE_FT
PRINCIPAL DISCHARGE DIAGNOSIS  Diagnosis: Cellulitis  Assessment and Plan of Treatment: Cellulitis of left leg---Refractory to oral Clindamycin and had allergic reaction to cephalosporin   - s/p 3 doses of IV vanco  - left leg duplex negative for DVT  - Derm recs appreciated likely contact dermatitis c/w clobetasol        SECONDARY DISCHARGE DIAGNOSES  Diagnosis: Dermatitis  Assessment and Plan of Treatment: -  continue Clobetasol propionate 0.05% ointment BID x2 weeks   -Follow-up with Dermatology --call for appointment   Dermatology  clinic located at [facility] [address], [address], [place], [place] ([phone])

## 2022-07-11 NOTE — PROGRESS NOTE ADULT - PROBLEM SELECTOR PLAN 1
- Refractory to oral Clindamycin and had allergic reaction to cephalosporin   - cont Vancomycin q12hrs, check vanc trough  - monitor for improvement in margins  - patient not septic, no elevated wbc count  - left leg duplex negative for DVT  - ID follow up for further abx?  - Derm recs appreciated likely contact dermatitis c/w clobetasol
- Refractory to oral Clindamycin and had allergic reaction to cephalosporin   - s/p 3 doses of IV vanco  - monitor for improvement in margins  - patient not septic, no elevated wbc count  - left leg duplex negative for DVT  - Derm recs appreciated likely contact dermatitis c/w clobetasol  - dc home 7/11
- Refractory to oral Clindamycin and had allergic reaction to cephalosporin   - s/p 3 doses of IV vanco  - monitor for improvement in margins  - patient not septic, no elevated wbc count  - left leg duplex negative for DVT  - Derm recs appreciated likely contact dermatitis c/w clobetasol  - monitor for 24 hrs off abx then dc home 7/11
- Refractory to oral Clindamycin and had allergic reaction to cephalosporin   - cont Vancomycin q12hrs, check vanc trough  - monitor for improvement in margins  - patient not septic, no elevated wbc count  - consult ID, f/u recs  - left leg duplex negative for DVT

## 2022-07-11 NOTE — PROGRESS NOTE ADULT - SUBJECTIVE AND OBJECTIVE BOX
Patient is a 26y old  Female who presents with a chief complaint of Cellulitis (08 Jul 2022 13:04)      SUBJECTIVE / OVERNIGHT EVENTS: feels better, denies leg pain, no cp, sob    MEDICATIONS  (STANDING):  chlorhexidine 4% Liquid 1 Application(s) Topical daily  clobetasol 0.05% Ointment 1 Application(s) Topical two times a day  enoxaparin Injectable 40 milliGRAM(s) SubCutaneous every 24 hours  loratadine 10 milliGRAM(s) Oral daily  vancomycin  IVPB 1000 milliGRAM(s) IV Intermittent every 8 hours    MEDICATIONS  (PRN):        CAPILLARY BLOOD GLUCOSE        I&O's Summary    08 Jul 2022 07:01  -  09 Jul 2022 07:00  --------------------------------------------------------  IN: 250 mL / OUT: 0 mL / NET: 250 mL    09 Jul 2022 07:01  -  09 Jul 2022 14:58  --------------------------------------------------------  IN: 640 mL / OUT: 0 mL / NET: 640 mL        PHYSICAL EXAM:  GENERAL: NAD, well-developed  HEAD:  Atraumatic, Normocephalic  EYES: EOMI, PERRLA, conjunctiva and sclera clear  NECK: Supple, No JVD  CHEST/LUNG: Clear to auscultation bilaterally; No wheeze  HEART: Regular rate and rhythm; No murmurs, rubs, or gallops  ABDOMEN: Soft, Nontender, Nondistended; Bowel sounds present  EXTREMITIES:   LLE eryhthema mild swelling. Area of redness has been demarcated with a purple marker. No wounds.  PSYCH: AAOx3      LABS:                        11.4   7.61  )-----------( 259      ( 09 Jul 2022 07:14 )             34.7     07-09    138  |  105  |  12  ----------------------------<  95  3.9   |  23  |  0.49<L>    Ca    8.8      09 Jul 2022 07:11  Mg     2.0     07-08    TPro  7.0  /  Alb  3.9  /  TBili  0.6  /  DBili  x   /  AST  9<L>  /  ALT  12  /  AlkPhos  81  07-08      CARDIAC MARKERS ( 08 Jul 2022 01:07 )  x     / x     / 73 U/L / x     / x              RADIOLOGY & ADDITIONAL TESTS:    Imaging Personally Reviewed:    Consultant(s) Notes Reviewed:      Care Discussed with Consultants/Other Providers:  
Patient is a 26y old  Female who presents with a chief complaint of Cellulitis (09 Jul 2022 14:57)    HPI:  Pt is a 27yo F w/ no PMH p/w LLE erythema, edema, warmth and tenderness to palpation x2wks. Symptoms initially started as mild redness to the region of her left ankle on 6/18 and then started progressing with some burning/itching. Pt initially applied benadryl cream to her entire leg which worsened the erythema. Associated symptoms at the time included edema and tightness. She then presented to her PMD on 07/05 who advised her to visit the ED for evaluation. There she underwent LLE US which was negative for DVT, and cultures were drawn, which have also been negative to date. She was administered Clindamycin and discharged on oral Clindamycin. Pt then re-presented to the ED on 07/07 due to worsening of symptoms and expansion of area of erythema.    She otherwise denies any F/C, numbness, tingling, trauma to the leg, CP, SOB, hx of DVT/PE, N/V, calf pain or other symptoms. No recent travel or outdoor activities.    In this ED visit she was administered Ancef given pen allergy after which she developed pruritis throughout her body. (08 Jul 2022 05:03)  Antibiotics were changed to vancomycin for LLE cellulitis  Yesterday patient received lovenox and developed pruritis and RUE erythema (? hives).  Team evaluation was that rashes were not cellulitis but allergic reactions and antibiotics have been held      PAST MEDICAL & SURGICAL HISTORY:  No pertinent past medical history      No significant past surgical history          Social history:     Smoking,    ETOH,      IVDU       FAMILY HISTORY:  FH: asthma (Mother)    - Reviewed,Non contributory   REVIEW OF SYSTEMS  General:	Denies any malaise fatigue or chills. Fevers absent    Skin:No rash  	  Ophthalmologic:Denies any visual complaints,discharge redness or photophobia  	  ENMT:No nasal discharge,headache,sinus congestion or throat pain.No dental complaints    Respiratory and Thorax:No cough,sputum or chest pain.Denies shortness of breath  	  Cardiovascular:	No chest pain,palpitaions or dizziness    Gastrointestinal:	NO nausea,abdominal pain or diarrhea.    Genitourinary:	No dysuria,frequency. No flank pain    Musculoskeletal:	No joint swelling or pain.No weakness    Neurological:No confusion,diziness.No extremity weakness.No bladder or bowel incontinence	    Psychiatric:No delusions or hallucinations	    Hematology/Lymphatics:	No LN swelling.No gum bleeding     Endocrine:	No recent weight gain or loss.No abnormal heat/cold intolerance    Allergic/Immunologic:	No hives or rash   Allergies    Benadryl (Other)  cefazolin (Hives; Rash)  Demerol (Other)  latex (Rash)  penicillin (Rash)  Reglan (Dystonic RXN)    Intolerances        Antimicrobials:    vancomycin  IVPB 1000 milliGRAM(s) IV Intermittent every 8 hours        Vital Signs Last 24 Hrs  T(C): 36.7 (10 Jul 2022 04:38), Max: 37.1 (09 Jul 2022 16:08)  T(F): 98 (10 Jul 2022 04:38), Max: 98.8 (09 Jul 2022 16:08)  HR: 79 (10 Jul 2022 04:38) (77 - 82)  BP: 104/68 (10 Jul 2022 04:38) (104/68 - 130/73)  BP(mean): --  RR: 18 (10 Jul 2022 04:38) (18 - 18)  SpO2: 100% (10 Jul 2022 04:38) (98% - 100%)    Parameters below as of 10 Jul 2022 04:38  Patient On (Oxygen Delivery Method): room air        PHYSICAL EXAM:Pleasant patient in no acute distress.      Constitutional:Comfortable.Awake and alert  No cachexia     Eyes:PERRL EOMI.NO discharge or conjunctival injection    ENMT:No sinus tenderness.No thrush.No pharyngeal exudate or erythema.Fair dental hygiene    Neck:Supple,No LN,no JVD      Respiratory:Good air entry bilaterally,CTA    Cardiovascular:S1 S2 wnl, No murmurs,rub or gallops    Gastrointestinal:Soft BS(+) no tenderness no masses ,No rebound or guarding    Genitourinary:No CVA tendereness     Rectal:    Extremities:No cyanosis,clubbing or edema.    Vascular:peripheral pulses felt    Neurological:AAO X 3,No grossly focal deficits    Skin:No rash     Lymph Nodes:No palpable LNs    Musculoskeletal:No joint swelling or LOM    Psychiatric:Affect normal.                                11.4   7.61  )-----------( 259      ( 09 Jul 2022 07:14 )             34.7         07-09    138  |  105  |  12  ----------------------------<  95  3.9   |  23  |  0.49<L>    Ca    8.8      09 Jul 2022 07:11        RECENT CULTURES:  07-05 @ 15:17  .Blood Blood  --  --  --    No growth to date.  --            
[facility] Division of Hospital Medicine  Daily Eric DO  Available on Teams Tamia    Patient is a 26y old  Female who presents with a chief complaint of Cellulitis (08 Jul 2022 11:33)      SUBJECTIVE / OVERNIGHT EVENTS: admitted this morning. Patient denies fevers, chills. Feels her left leg is swollen. Has normal sensation, still able to move the leg. Had a rash in reaction to ancef administration overnight.  ADDITIONAL REVIEW OF SYSTEMS: negative    MEDICATIONS  (STANDING):  chlorhexidine 4% Liquid 1 Application(s) Topical daily  enoxaparin Injectable 40 milliGRAM(s) SubCutaneous every 24 hours  loratadine 10 milliGRAM(s) Oral daily  vancomycin  IVPB 1000 milliGRAM(s) IV Intermittent every 12 hours    MEDICATIONS  (PRN):      CAPILLARY BLOOD GLUCOSE        I&O's Summary      PHYSICAL EXAM:  Vital Signs Last 24 Hrs  T(C): 37.1 (08 Jul 2022 11:15), Max: 37.1 (07 Jul 2022 19:42)  T(F): 98.7 (08 Jul 2022 11:15), Max: 98.8 (07 Jul 2022 19:42)  HR: 80 (08 Jul 2022 11:15) (78 - 90)  BP: 95/65 (08 Jul 2022 11:15) (95/65 - 133/98)  BP(mean): --  RR: 18 (08 Jul 2022 11:15) (16 - 18)  SpO2: 100% (08 Jul 2022 11:15) (98% - 100%)    Parameters below as of 08 Jul 2022 11:15  Patient On (Oxygen Delivery Method): room air        CONSTITUTIONAL: Well-groomed, in no apparent distress  EYES: No conjunctival or scleral injection, non-icteric; PERRLA and symmetric  ENMT: No external nasal lesions; no pharyngeal injection or exudates, oral mucosa with moist membranes  NECK: Trachea midline without palpable neck mass; thyroid not enlarged and non-tender  RESPIRATORY: Breathing comfortably; lungs CTA without wheeze/rhonchi/rales  CARDIOVASCULAR: +S1S2, RRR, no M/G/R; pedal pulses full and symmetric; + nonpitting lower extremity edema in LLE  GASTROINTESTINAL: No palpable masses or tenderness, +BS throughout, no rebound/guarding  MUSCULOSKELETAL: no digital clubbing or cyanosis; no paraspinal tenderness; normal strength and tone of extremities  SKIN: skin of LLE is red, non-raised, with mild swelling. Area of redness has been demarcated with a purple marker. No wounds.  NEUROLOGIC: CN II-XII intact; sensation intact in LEs b/l to light touch  PSYCHIATRIC: A+O x 3; mood and affect appropriate; appropriate insight and judgment    LABS:                        10.9   9.59  )-----------( 251      ( 08 Jul 2022 01:07 )             34.3     07-08    137  |  104  |  17  ----------------------------<  107<H>  3.8   |  23  |  0.76    Ca    8.6      08 Jul 2022 01:07  Mg     2.0     07-08    TPro  7.0  /  Alb  3.9  /  TBili  0.6  /  DBili  x   /  AST  9<L>  /  ALT  12  /  AlkPhos  81  07-08      CARDIAC MARKERS ( 08 Jul 2022 01:07 )  x     / x     / 73 U/L / x     / x              Culture - Blood (collected 05 Jul 2022 15:17)  Source: .Blood Blood  Preliminary Report (07 Jul 2022 11:01):    No growth to date.    Culture - Blood (collected 05 Jul 2022 15:17)  Source: .Blood Blood  Preliminary Report (07 Jul 2022 04:01):    No growth to date.
Patient is a 26y old  Female who presents with a chief complaint of Cellulitis (10 Jul 2022 07:37)      SUBJECTIVE / OVERNIGHT EVENTS: feels better, no cp, sob, abd pain     MEDICATIONS  (STANDING):  chlorhexidine 4% Liquid 1 Application(s) Topical daily  clobetasol 0.05% Ointment 1 Application(s) Topical two times a day  loratadine 10 milliGRAM(s) Oral daily    MEDICATIONS  (PRN):        CAPILLARY BLOOD GLUCOSE        I&O's Summary    09 Jul 2022 07:01  -  10 Jul 2022 07:00  --------------------------------------------------------  IN: 640 mL / OUT: 0 mL / NET: 640 mL    10 Jul 2022 07:01  -  10 Jul 2022 15:07  --------------------------------------------------------  IN: 320 mL / OUT: 0 mL / NET: 320 mL        PHYSICAL EXAM:  GENERAL: NAD, well-developed  HEAD:  Atraumatic, Normocephalic  EYES: conjunctiva and sclera clear  NECK: Supple, No JVD  CHEST/LUNG: Clear to auscultation bilaterally; No wheeze  HEART: Regular rate and rhythm; No murmurs, rubs, or gallops  ABDOMEN: Soft, Nontender, Nondistended; Bowel sounds present  EXTREMITIES:  2+ Peripheral Pulses, erythema along LLE improved from demarcation   PSYCH: AAOx3      LABS:                        11.4   7.61  )-----------( 259      ( 09 Jul 2022 07:14 )             34.7     07-09    138  |  105  |  12  ----------------------------<  95  3.9   |  23  |  0.49<L>    Ca    8.8      09 Jul 2022 07:11                RADIOLOGY & ADDITIONAL TESTS:    Imaging Personally Reviewed:    Consultant(s) Notes Reviewed:      Care Discussed with Consultants/Other Providers:  
Patient is a 26y old  Female who presents with a chief complaint of Cellulitis (10 Jul 2022 15:07)      SUBJECTIVE / OVERNIGHT EVENTS: feels better, no pain in leg, no cp, sob     MEDICATIONS  (STANDING):  chlorhexidine 4% Liquid 1 Application(s) Topical daily  clobetasol 0.05% Ointment 1 Application(s) Topical two times a day  loratadine 10 milliGRAM(s) Oral daily    MEDICATIONS  (PRN):        CAPILLARY BLOOD GLUCOSE        I&O's Summary    10 Jul 2022 07:01  -  11 Jul 2022 07:00  --------------------------------------------------------  IN: 320 mL / OUT: 0 mL / NET: 320 mL        PHYSICAL EXAM:  GENERAL: NAD, well-developed  HEAD:  Atraumatic, Normocephalic  EYES: conjunctiva and sclera clear  NECK: Supple, No JVD  CHEST/LUNG: Clear to auscultation bilaterally; No wheeze  HEART: Regular rate and rhythm; No murmurs, rubs, or gallops  ABDOMEN: Soft, Nontender, Nondistended; Bowel sounds present  EXTREMITIES:  2+ Peripheral Pulses, erythema along LLE improved from demarcation   PSYCH: AAOx3    LABS:                    RADIOLOGY & ADDITIONAL TESTS:    Imaging Personally Reviewed:    Consultant(s) Notes Reviewed:      Care Discussed with Consultants/Other Providers:

## 2022-07-11 NOTE — DISCHARGE NOTE PROVIDER - HOSPITAL COURSE
Pt is a 25yo F w/ no PMH p/w LLE erythema, edema, warmth and tenderness to palpation x2wks likely i/s/o cellulitis now refractory to oral antibiotics (Clindamycin) and c/f allergic reaction to Cefazolin in the ED.    Problem/Plan - 1:  ·  Problem: Cellulitis of left leg.   ·  Plan: - Refractory to oral Clindamycin and had allergic reaction to cephalosporin   - s/p 3 doses of IV vanco  - monitor for improvement in margins  - patient not septic, no elevated wbc count  - left leg duplex negative for DVT  - Derm recs appreciated likely contact dermatitis c/w clobetasol  - dc home 7/11.     Patient is now medially stable for discharge home with follow-up as advised

## 2022-07-11 NOTE — DISCHARGE NOTE NURSING/CASE MANAGEMENT/SOCIAL WORK - NSDCPEFALRISK_GEN_ALL_CORE
For information on Fall & Injury Prevention, visit: [email or web address] OR  [email or web address] OR  https://www.cdc.gov/steadi/patient.html

## 2022-07-11 NOTE — DISCHARGE NOTE PROVIDER - NSDCMRMEDTOKEN_GEN_ALL_CORE_FT
clobetasol 0.05% topical ointment: 1 application topically 2 times a day  loratadine 10 mg oral tablet: 1 tab(s) orally once a day

## 2022-07-11 NOTE — PROGRESS NOTE ADULT - PROBLEM SELECTOR PLAN 2
DVT ppx: Lovenox sq  Diet: Regular  Dispo: Pending clinical improvement
DVT ppx: Lovenox sq  Diet: Regular  Dispo: Home    D/w parents 7/10
DVT ppx: Lovenox sq  Diet: Regular  Dispo: Home    D/w parents 7/11
DVT ppx: Lovenox sq  Diet: Regular  Dispo: Pending clinical improvement

## 2022-07-11 NOTE — PROGRESS NOTE ADULT - NSPROGADDITIONALINFOA_GEN_ALL_CORE
D/w ACP Jackie
Gerald ACP Jackie
Plan discussed with ***    Daily Eric, DO  Available on Teams shai
D/w DAVID Fowler

## 2022-07-11 NOTE — DISCHARGE NOTE NURSING/CASE MANAGEMENT/SOCIAL WORK - PATIENT PORTAL LINK FT
You can access the FollowMyHealth Patient Portal offered by [facility] by registering at the following website: [email or web address]. By joining [employer]’s FollowMyHealth portal, you will also be able to view your health information using other applications (apps) compatible with our system.

## 2022-07-11 NOTE — DISCHARGE NOTE PROVIDER - CARE PROVIDER_API CALL
ANA [place]  Internal Medicine  [address] [place] [address]  Phone: [phone]  Fax: [phone]  Follow Up Time:

## 2022-07-14 LAB
CULTURE RESULTS: SIGNIFICANT CHANGE UP
SPECIMEN SOURCE: SIGNIFICANT CHANGE UP

## 2023-01-12 ENCOUNTER — EMERGENCY (EMERGENCY)
Facility: HOSPITAL | Age: 28
LOS: 1 days | Discharge: HOME | End: 2023-01-12
Attending: EMERGENCY MEDICINE | Admitting: EMERGENCY MEDICINE
Payer: MEDICAID

## 2023-01-12 VITALS
SYSTOLIC BLOOD PRESSURE: 116 MMHG | TEMPERATURE: 98 F | WEIGHT: 229.94 LBS | HEART RATE: 84 BPM | DIASTOLIC BLOOD PRESSURE: 73 MMHG | OXYGEN SATURATION: 99 % | HEIGHT: 67 IN | RESPIRATION RATE: 16 BRPM

## 2023-01-12 VITALS
RESPIRATION RATE: 16 BRPM | SYSTOLIC BLOOD PRESSURE: 100 MMHG | HEART RATE: 75 BPM | DIASTOLIC BLOOD PRESSURE: 68 MMHG | OXYGEN SATURATION: 100 % | TEMPERATURE: 98 F

## 2023-01-12 LAB
ALBUMIN SERPL ELPH-MCNC: 3.5 G/DL — SIGNIFICANT CHANGE UP (ref 3.3–5)
ALP SERPL-CCNC: 105 U/L — SIGNIFICANT CHANGE UP (ref 30–120)
ALT FLD-CCNC: 18 U/L DA — SIGNIFICANT CHANGE UP (ref 10–60)
ANION GAP SERPL CALC-SCNC: 7 MMOL/L — SIGNIFICANT CHANGE UP (ref 5–17)
AST SERPL-CCNC: 13 U/L — SIGNIFICANT CHANGE UP (ref 10–40)
BASOPHILS # BLD AUTO: 0.07 K/UL — SIGNIFICANT CHANGE UP (ref 0–0.2)
BASOPHILS NFR BLD AUTO: 0.8 % — SIGNIFICANT CHANGE UP (ref 0–2)
BILIRUB SERPL-MCNC: 0.5 MG/DL — SIGNIFICANT CHANGE UP (ref 0.2–1.2)
BUN SERPL-MCNC: 19 MG/DL — SIGNIFICANT CHANGE UP (ref 7–23)
CALCIUM SERPL-MCNC: 8.4 MG/DL — SIGNIFICANT CHANGE UP (ref 8.4–10.5)
CHLORIDE SERPL-SCNC: 102 MMOL/L — SIGNIFICANT CHANGE UP (ref 96–108)
CO2 SERPL-SCNC: 27 MMOL/L — SIGNIFICANT CHANGE UP (ref 22–31)
CREAT SERPL-MCNC: 0.57 MG/DL — SIGNIFICANT CHANGE UP (ref 0.5–1.3)
EGFR: 128 ML/MIN/1.73M2 — SIGNIFICANT CHANGE UP
EOSINOPHIL # BLD AUTO: 0.5 K/UL — SIGNIFICANT CHANGE UP (ref 0–0.5)
EOSINOPHIL NFR BLD AUTO: 5.7 % — SIGNIFICANT CHANGE UP (ref 0–6)
GLUCOSE SERPL-MCNC: 93 MG/DL — SIGNIFICANT CHANGE UP (ref 70–99)
HCG SERPL-ACNC: 1 MIU/ML — SIGNIFICANT CHANGE UP
HCG UR QL: NEGATIVE — SIGNIFICANT CHANGE UP
HCT VFR BLD CALC: 37 % — SIGNIFICANT CHANGE UP (ref 34.5–45)
HGB BLD-MCNC: 12.1 G/DL — SIGNIFICANT CHANGE UP (ref 11.5–15.5)
IMM GRANULOCYTES NFR BLD AUTO: 0.5 % — SIGNIFICANT CHANGE UP (ref 0–0.9)
LYMPHOCYTES # BLD AUTO: 2.45 K/UL — SIGNIFICANT CHANGE UP (ref 1–3.3)
LYMPHOCYTES # BLD AUTO: 27.8 % — SIGNIFICANT CHANGE UP (ref 13–44)
MCHC RBC-ENTMCNC: 27.9 PG — SIGNIFICANT CHANGE UP (ref 27–34)
MCHC RBC-ENTMCNC: 32.7 GM/DL — SIGNIFICANT CHANGE UP (ref 32–36)
MCV RBC AUTO: 85.3 FL — SIGNIFICANT CHANGE UP (ref 80–100)
MONOCYTES # BLD AUTO: 0.5 K/UL — SIGNIFICANT CHANGE UP (ref 0–0.9)
MONOCYTES NFR BLD AUTO: 5.7 % — SIGNIFICANT CHANGE UP (ref 2–14)
NEUTROPHILS # BLD AUTO: 5.25 K/UL — SIGNIFICANT CHANGE UP (ref 1.8–7.4)
NEUTROPHILS NFR BLD AUTO: 59.5 % — SIGNIFICANT CHANGE UP (ref 43–77)
NRBC # BLD: 0 /100 WBCS — SIGNIFICANT CHANGE UP (ref 0–0)
PLATELET # BLD AUTO: 306 K/UL — SIGNIFICANT CHANGE UP (ref 150–400)
POTASSIUM SERPL-MCNC: 4 MMOL/L — SIGNIFICANT CHANGE UP (ref 3.5–5.3)
POTASSIUM SERPL-SCNC: 4 MMOL/L — SIGNIFICANT CHANGE UP (ref 3.5–5.3)
PROCALCITONIN SERPL-MCNC: <0.02 NG/ML — SIGNIFICANT CHANGE UP (ref 0.02–0.1)
PROT SERPL-MCNC: 7.5 G/DL — SIGNIFICANT CHANGE UP (ref 6–8.3)
RBC # BLD: 4.34 M/UL — SIGNIFICANT CHANGE UP (ref 3.8–5.2)
RBC # FLD: 13.5 % — SIGNIFICANT CHANGE UP (ref 10.3–14.5)
SODIUM SERPL-SCNC: 136 MMOL/L — SIGNIFICANT CHANGE UP (ref 135–145)
WBC # BLD: 8.81 K/UL — SIGNIFICANT CHANGE UP (ref 3.8–10.5)
WBC # FLD AUTO: 8.81 K/UL — SIGNIFICANT CHANGE UP (ref 3.8–10.5)

## 2023-01-12 PROCEDURE — 96375 TX/PRO/DX INJ NEW DRUG ADDON: CPT

## 2023-01-12 PROCEDURE — 96374 THER/PROPH/DIAG INJ IV PUSH: CPT

## 2023-01-12 PROCEDURE — 80053 COMPREHEN METABOLIC PANEL: CPT

## 2023-01-12 PROCEDURE — 99284 EMERGENCY DEPT VISIT MOD MDM: CPT

## 2023-01-12 PROCEDURE — 85025 COMPLETE CBC W/AUTO DIFF WBC: CPT

## 2023-01-12 PROCEDURE — 84145 PROCALCITONIN (PCT): CPT

## 2023-01-12 PROCEDURE — 93971 EXTREMITY STUDY: CPT

## 2023-01-12 PROCEDURE — 93971 EXTREMITY STUDY: CPT | Mod: 26,RT

## 2023-01-12 PROCEDURE — 81025 URINE PREGNANCY TEST: CPT

## 2023-01-12 PROCEDURE — 84702 CHORIONIC GONADOTROPIN TEST: CPT

## 2023-01-12 PROCEDURE — 36415 COLL VENOUS BLD VENIPUNCTURE: CPT

## 2023-01-12 RX ORDER — FAMOTIDINE 10 MG/ML
20 INJECTION INTRAVENOUS ONCE
Refills: 0 | Status: COMPLETED | OUTPATIENT
Start: 2023-01-12 | End: 2023-01-12

## 2023-01-12 RX ADMIN — Medication 125 MILLIGRAM(S): at 12:37

## 2023-01-12 RX ADMIN — FAMOTIDINE 20 MILLIGRAM(S): 10 INJECTION INTRAVENOUS at 12:36

## 2023-01-12 NOTE — ED PROVIDER NOTE - PATIENT PORTAL LINK FT
You can access the FollowMyHealth Patient Portal offered by [facility] by registering at the following website: [email or web address]. By joining [email or web address]’s FollowMyHealth portal, you will also be able to view your health information using other applications (apps) compatible with our system.

## 2023-01-12 NOTE — ED PROVIDER NOTE - CONSTITUTIONAL NEGATIVE STATEMENT, MLM
no fever and no chills.

## 2023-01-12 NOTE — ED ADULT NURSE NOTE - CAS EDN DISCHARGE ASSESSMENT
Skin normal color for race, warm, dry and intact. No evidence of rash.
Alert and oriented to person, place and time

## 2023-01-12 NOTE — ED PROVIDER NOTE - DIFFERENTIAL DIAGNOSIS
Differential Diagnosis Differential including but not limited to DVT cellulitis allergic reaction dermatitis

## 2023-01-12 NOTE — ED PROVIDER NOTE - NSFOLLOWUPCLINICS_GEN_ALL_ED_FT
[facility] - [place]  Dermatology  [address] [place] [address]  Phone: [phone]  Fax: [phone]  Follow Up Time: 1-3 Days    [facility] - [place]  Dermatology  [address]  [place] [address]  Phone: [phone]  Fax: [phone]  Follow Up Time: 1-3 Days

## 2023-01-12 NOTE — ED PROVIDER NOTE - NSFOLLOWUPINSTRUCTIONS_ED_ALL_ED_FT
Follow up with your primary care doctor and dermatology. Return for fever, increasing swelling, increasing rash, numbness/weakness, worsening condition. Antibiotics and steroids were sent to your pharmacy       Cellulitis, Adult    A person's legs and feet. One leg is normal and the other leg is affected by cellulitis.   Cellulitis is a skin infection. The infected area is usually warm, red, swollen, and tender. This condition occurs most often in the arms and lower legs. The infection can travel to the muscles, blood, and underlying tissue and become serious. It is very important to get treated for this condition.      What are the causes?    Cellulitis is caused by bacteria. The bacteria enter through a break in the skin, such as a cut, burn, insect bite, open sore, or crack.      What increases the risk?    This condition is more likely to occur in people who:  •Have a weak body defense system (immune system).      •Have open wounds on the skin, such as cuts, burns, bites, and scrapes. Bacteria can enter the body through these open wounds.      •Are older than 60 years of age.      •Have diabetes.      •Have a type of long-lasting (chronic) liver disease (cirrhosis) or kidney disease.      •Are obese.    •Have a skin condition such as:  •Itchy rash (eczema).      •Slow movement of blood in the veins (venous stasis).      •Fluid buildup below the skin (edema).        •Have had radiation therapy.      •Use IV drugs.        What are the signs or symptoms?    Symptoms of this condition include:  •Redness, streaking, or spotting on the skin.      •Swollen area of the skin.      •Tenderness or pain when an area of the skin is touched.      •Warm skin.      •A fever.      •Chills.      •Blisters.        How is this diagnosed?    This condition is diagnosed based on a medical history and physical exam. You may also have tests, including:  •Blood tests.      •Imaging tests.        How is this treated?    Treatment for this condition may include:  •Medicines, such as antibiotic medicines or medicines to treat allergies (antihistamines).      •Supportive care, such as rest and application of cold or warm cloths (compresses) to the skin.      •Hospital care, if the condition is severe.      The infection usually starts to get better within 1–2 days of treatment.      Follow these instructions at home:  A comparison of three sample cups showing dark yellow, yellow, and pale yellow urine.   Medicines     •Take over-the-counter and prescription medicines only as told by your health care provider.      •If you were prescribed an antibiotic medicine, take it as told by your health care provider. Do not stop taking the antibiotic even if you start to feel better.      General instructions     •Drink enough fluid to keep your urine pale yellow.      • Do not touch or rub the infected area.      •Raise (elevate) the infected area above the level of your heart while you are sitting or lying down.      •Apply warm or cold compresses to the affected area as told by your health care provider.      •Keep all follow-up visits as told by your health care provider. This is important. These visits let your health care provider make sure a more serious infection is not developing.        Contact a health care provider if:    •You have a fever.      •Your symptoms do not begin to improve within 1–2 days of starting treatment.      •Your bone or joint underneath the infected area becomes painful after the skin has healed.      •Your infection returns in the same area or another area.      •You notice a swollen bump in the infected area.      •You develop new symptoms.      •You have a general ill feeling (malaise) with muscle aches and pains.        Get help right away if:    •Your symptoms get worse.      •You feel very sleepy.      •You develop vomiting or diarrhea that persists.      •You notice red streaks coming from the infected area.      •Your red area gets larger or turns dark in color.      These symptoms may represent a serious problem that is an emergency. Do not wait to see if the symptoms will go away. Get medical help right away. Call your local emergency services (911 in the [place] Do not drive yourself to the hospital.       Summary    •Cellulitis is a skin infection. This condition occurs most often in the arms and lower legs.      •Treatment for this condition may include medicines, such as antibiotic medicines or antihistamines.      •Take over-the-counter and prescription medicines only as told by your health care provider. If you were prescribed an antibiotic medicine, do not stop taking the antibiotic even if you start to feel better.      •Contact a health care provider if your symptoms do not begin to improve within 1–2 days of starting treatment or your symptoms get worse.      •Keep all follow-up visits as told by your health care provider. This is important. These visits let your health care provider make sure that a more serious infection is not developing.      This information is not intended to replace advice given to you by your health care provider. Make sure you discuss any questions you have with your health care provider. Follow up with your primary care doctor and dermatology. Return for fever, increasing swelling, increasing rash, numbness/weakness, worsening condition. Antibiotics and steroids were sent to your pharmacy       Cellulitis, Adult    A person's legs and feet. One leg is normal and the other leg is affected by cellulitis.   Cellulitis is a skin infection. The infected area is usually warm, red, swollen, and tender. This condition occurs most often in the arms and lower legs. The infection can travel to the muscles, blood, and underlying tissue and become serious. It is very important to get treated for this condition.      What are the causes?    Cellulitis is caused by bacteria. The bacteria enter through a break in the skin, such as a cut, burn, insect bite, open sore, or crack.      What increases the risk?    This condition is more likely to occur in people who:  •Have a weak body defense system (immune system).      •Have open wounds on the skin, such as cuts, burns, bites, and scrapes. Bacteria can enter the body through these open wounds.      •Are older than 60 years of age.      •Have diabetes.      •Have a type of long-lasting (chronic) liver disease (cirrhosis) or kidney disease.      •Are obese.    •Have a skin condition such as:  •Itchy rash (eczema).      •Slow movement of blood in the veins (venous stasis).      •Fluid buildup below the skin (edema).        •Have had radiation therapy.      •Use IV drugs.        What are the signs or symptoms?    Symptoms of this condition include:  •Redness, streaking, or spotting on the skin.      •Swollen area of the skin.      •Tenderness or pain when an area of the skin is touched.      •Warm skin.      •A fever.      •Chills.      •Blisters.        How is this diagnosed?    This condition is diagnosed based on a medical history and physical exam. You may also have tests, including:  •Blood tests.      •Imaging tests.        How is this treated?    Treatment for this condition may include:  •Medicines, such as antibiotic medicines or medicines to treat allergies (antihistamines).      •Supportive care, such as rest and application of cold or warm cloths (compresses) to the skin.      •Hospital care, if the condition is severe.      The infection usually starts to get better within 1–2 days of treatment.      Follow these instructions at home:  A comparison of three sample cups showing dark yellow, yellow, and pale yellow urine.   Medicines     •Take over-the-counter and prescription medicines only as told by your health care provider.      •If you were prescribed an antibiotic medicine, take it as told by your health care provider. Do not stop taking the antibiotic even if you start to feel better.      General instructions     •Drink enough fluid to keep your urine pale yellow.      • Do not touch or rub the infected area.      •Raise (elevate) the infected area above the level of your heart while you are sitting or lying down.      •Apply warm or cold compresses to the affected area as told by your health care provider.      •Keep all follow-up visits as told by your health care provider. This is important. These visits let your health care provider make sure a more serious infection is not developing.        Contact a health care provider if:    •You have a fever.      •Your symptoms do not begin to improve within 1–2 days of starting treatment.      •Your bone or joint underneath the infected area becomes painful after the skin has healed.      •Your infection returns in the same area or another area.      •You notice a swollen bump in the infected area.      •You develop new symptoms.      •You have a general ill feeling (malaise) with muscle aches and pains.        Get help right away if:    •Your symptoms get worse.      •You feel very sleepy.      •You develop vomiting or diarrhea that persists.      •You notice red streaks coming from the infected area.      •Your red area gets larger or turns dark in color.      These symptoms may represent a serious problem that is an emergency. Do not wait to see if the symptoms will go away. Get medical help right away. Call your local emergency services (911 in the [place] Do not drive yourself to the hospital.       Summary    •Cellulitis is a skin infection. This condition occurs most often in the arms and lower legs.      •Treatment for this condition may include medicines, such as antibiotic medicines or antihistamines.      •Take over-the-counter and prescription medicines only as told by your health care provider. If you were prescribed an antibiotic medicine, do not stop taking the antibiotic even if you start to feel better.      •Contact a health care provider if your symptoms do not begin to improve within 1–2 days of starting treatment or your symptoms get worse.      •Keep all follow-up visits as told by your health care provider. This is important. These visits let your health care provider make sure that a more serious infection is not developing.      This information is not intended to replace advice given to you by your health care provider. Make sure you discuss any questions you have with your health care provider.        Contact Dermatitis      Dermatitis is redness, soreness, and swelling (inflammation) of the skin. Contact dermatitis is a reaction to certain substances that touch the skin. Many different substances can cause contact dermatitis. There are two types of contact dermatitis:  •Irritant contact dermatitis. This type is caused by something that irritates your skin, such as having dry hands from washing them too often with soap. This type does not require previous exposure to the substance for a reaction to occur. This is the most common type.      •Allergic contact dermatitis. This type is caused by a substance that you are allergic to, such as poison ivy. This type occurs when you have been exposed to the substance (allergen) and develop a sensitivity to it. Dermatitis may develop soon after your first exposure to the allergen, or it may not develop until the next time you are exposed and every time thereafter.        What are the causes?    Irritant contact dermatitis is most commonly caused by exposure to:  •Makeup.      •Soaps.      •Detergents.      •Bleaches.      •Acids.      •Metal salts, such as nickel.      Allergic contact dermatitis is most commonly caused by exposure to:  •Poisonous plants.      •Chemicals.      •Jewelry.      •Latex.      •Medicines.      •Preservatives in products, such as clothing.        What increases the risk?    You are more likely to develop this condition if you have:  •A job that exposes you to irritants or allergens.      •Certain medical conditions, such as asthma or eczema.        What are the signs or symptoms?  A person's hands, showing areas of redness and blisters caused by contact dermatitis.   Symptoms of this condition may occur on your body anywhere the irritant has touched you or is touched by you.•Symptoms include:  •Dryness or flaking.      •Redness.      •Cracks.      •Itching.      •Pain or a burning feeling.      •Blisters.      •Drainage of small amounts of blood or clear fluid from skin cracks.        With allergic contact dermatitis, there may also be swelling in areas such as the eyelids, mouth, or genitals.      How is this diagnosed?    This condition is diagnosed with a medical history and physical exam.  •A patch skin test may be performed to help determine the cause.      •If the condition is related to your job, you may need to see an occupational medicine specialist.        How is this treated?    This condition is treated by checking for the cause of the reaction and protecting your skin from further contact. Treatment may also include:  •Steroid creams or ointments. Oral steroid medicines may be needed in more severe cases.      •Antibiotic medicines or antibacterial ointments, if a skin infection is present.      •Antihistamine lotion or an antihistamine taken by mouth to ease itching.      •A bandage (dressing).        Follow these instructions at home:    Skin care     •Moisturize your skin as needed.      •Apply cool compresses to the affected areas.      •Try applying baking soda paste to your skin. Stir water into baking soda until it reaches a paste-like consistency.      • Do not scratch your skin, and avoid friction to the affected area.      •Avoid the use of soaps, perfumes, and dyes.      Medicines     •Take or apply over-the-counter and prescription medicines only as told by your health care provider.      •If you were prescribed an antibiotic medicine, take or apply the antibiotic as told by your health care provider. Do not stop using the antibiotic even if your condition improves.      Bathing   •Try taking a bath with:  •Epsom salts. Follow the instructions on the packaging. You can get these at your local pharmacy or grocery store.      •Baking soda. Pour a small amount into the bath as directed by your health care provider.      •Colloidal oatmeal. Follow the instructions on the packaging. You can get this at your local pharmacy or grocery store.        •Bathe less frequently, such as every other day.      •Bathe in lukewarm water. Avoid using hot water.      Bandage care     •If you were given a bandage (dressing), change it as told by your health care provider.      •Wash your hands with soap and water before and after you change your dressing. If soap and water are not available, use hand .      General instructions   •Avoid the substance that caused your reaction. If you do not know what caused it, keep a journal to try to track what caused it. Write down:  •What you eat.      •What cosmetic products you use.      •What you drink.      •What you wear in the affected area. This includes jewelry.      •Check the affected areas every day for signs of infection. Check for:  •More redness, swelling, or pain.      •More fluid or blood.      •Warmth.      •Pus or a bad smell.        •Keep all follow-up visits as told by your health care provider. This is important.        Contact a health care provider if:    •Your condition does not improve with treatment.      •Your condition gets worse.      •You have signs of infection such as swelling, tenderness, redness, soreness, or warmth in the affected area.      •You have a fever.      •You have new symptoms.        Get help right away if:    •You have a severe headache, neck pain, or neck stiffness.      •You vomit.      •You feel very sleepy.      •You notice red streaks coming from the affected area.      •Your bone or joint underneath the affected area becomes painful after the skin has healed.      •The affected area turns darker.      •You have difficulty breathing.        Summary    •Dermatitis is redness, soreness, and swelling (inflammation) of the skin. Contact dermatitis is a reaction to certain substances that touch the skin.      •Symptoms of this condition may occur on your body anywhere the irritant has touched you or is touched by you.      •This condition is treated by figuring out what caused the reaction and protecting your skin from further contact. Treatment may also include medicines and skin care.      •Avoid the substance that caused your reaction. If you do not know what caused it, keep a journal to try to track what caused it.      •Contact a health care provider if your condition gets worse or you have signs of infection such as swelling, tenderness, redness, soreness, or warmth in the affected area.      This information is not intended to replace advice given to you by your health care provider. Make sure you discuss any questions you have with your health care provider.

## 2023-01-12 NOTE — ED ADULT NURSE NOTE - OBJECTIVE STATEMENT
pt with swelling and redness to right forearm, noncircumferential, began Friday afternoon, noted to be worsening each day after waking up. reports she was admitted in July 8-11 2022 to [facility] for a similar event that occurred to left lower leg. denies fevers.

## 2023-01-12 NOTE — ED PROVIDER NOTE - OBJECTIVE STATEMENT
27-year-old female with no reported past medical history presents today complaining of right arm rash and swelling.  Patient reports last week she was wearing a new prescription tree in which she noticed some irritation to the skin and a lesion to the radial aspect of the wrist.  Patient reports she saw her PCP Good Beebe yesterday in which he was advised to go to the ED today due to travel issues.  Patient reports she had this in the past on the left lower extremity in which she required antibiotics.  Patient is not currently on antibiotics.  Patient admits to itchiness to the rash.  Patient denies fever, numbness, weakness, chest pain, shortness of breath, injury, fever or any other complaints. 27-year-old female with no reported past medical history presents today complaining of right arm rash and swelling.  Patient reports last week she was wearing a scrunchy in which she noticed some irritation to the skin and a lesion to the radial aspect of the wrist.  Patient reports she saw her PCP Good Beebe yesterday in which he was advised to go to the ED today due to travel issues.  Patient reports she had this in the past on the left lower extremity in which she required antibiotics.  Patient is not currently on antibiotics.  Patient admits to itchiness to the rash.  Patient denies fever, numbness, weakness, chest pain, shortness of breath, injury, fever or any other complaints.

## 2023-01-12 NOTE — ED PROVIDER NOTE - CLINICAL SUMMARY MEDICAL DECISION MAKING FREE TEXT BOX
Patient complaining of swelling and erythema to right forearm and wrist status post wearing a new scrunchy.  Patient relates rash is itchy but has become painful.  No fevers chills weakness numbness trauma chest pain short of breath difficulty breathing or swallowing swelling of the face lips mouth tongue or throat.    Plan labs upper extremity Doppler Pepcid Solu-Medrol antibiotics  Differential including but not limited to DVT cellulitis allergic reaction dermatitis

## 2023-02-09 NOTE — H&P ADULT - NSHPREVIEWOFSYSTEMS_GEN_ALL_CORE
CONSTITUTIONAL: No fever, weight loss  EYES: No eye pain, visual disturbances, or discharge  ENMT:  No difficulty hearing, tinnitus, vertigo; No sinus or throat pain  RESPIRATORY: No SOB. No cough, wheezing, chills or hemoptysis  CARDIOVASCULAR: No chest pain, palpitations, dizziness, or leg swelling  GASTROINTESTINAL: No abdominal or epigastric pain. No nausea, vomiting, or hematemesis; No diarrhea or constipation. No melena or hematochezia.  GENITOURINARY: No dysuria, frequency, hematuria, or incontinence  NEUROLOGICAL: No headaches, memory loss, loss of strength, numbness, or tremors  SKIN: Erythema, edema and pain to LLE. No itching, burning  LYMPH NODES: No enlarged glands  ENDOCRINE: No heat or cold intolerance; No hair loss  MUSCULOSKELETAL: No joint pain or swelling; No muscle, back pain  PSYCHIATRIC: No depression, anxiety, mood swings, or difficulty sleeping  HEME/LYMPH: No easy bruising, or bleeding gums Statement Selected

## 2023-07-23 ENCOUNTER — NON-APPOINTMENT (OUTPATIENT)
Age: 28
End: 2023-07-23

## 2023-10-27 ENCOUNTER — NON-APPOINTMENT (OUTPATIENT)
Age: 28
End: 2023-10-27

## 2023-10-29 ENCOUNTER — INPATIENT (INPATIENT)
Facility: HOSPITAL | Age: 28
LOS: 3 days | Discharge: HOME | DRG: 690 | End: 2023-11-02
Attending: FAMILY MEDICINE | Admitting: FAMILY MEDICINE
Payer: MEDICAID

## 2023-10-29 ENCOUNTER — INPATIENT (INPATIENT)
Facility: HOSPITAL | Age: 28
LOS: 0 days | Discharge: ANOTHER HOSPITAL | DRG: 690 | End: 2023-10-29
Attending: FAMILY MEDICINE | Admitting: FAMILY MEDICINE
Payer: MEDICAID

## 2023-10-29 VITALS
TEMPERATURE: 98 F | SYSTOLIC BLOOD PRESSURE: 108 MMHG | DIASTOLIC BLOOD PRESSURE: 71 MMHG | OXYGEN SATURATION: 97 % | RESPIRATION RATE: 16 BRPM | HEART RATE: 72 BPM

## 2023-10-29 VITALS
SYSTOLIC BLOOD PRESSURE: 126 MMHG | HEART RATE: 85 BPM | DIASTOLIC BLOOD PRESSURE: 78 MMHG | TEMPERATURE: 98 F | OXYGEN SATURATION: 95 % | RESPIRATION RATE: 17 BRPM

## 2023-10-29 VITALS
OXYGEN SATURATION: 98 % | HEART RATE: 115 BPM | HEIGHT: 67 IN | RESPIRATION RATE: 22 BRPM | SYSTOLIC BLOOD PRESSURE: 101 MMHG | TEMPERATURE: 103 F | WEIGHT: 235.01 LBS | DIASTOLIC BLOOD PRESSURE: 70 MMHG

## 2023-10-29 DIAGNOSIS — N10 ACUTE PYELONEPHRITIS: ICD-10-CM

## 2023-10-29 DIAGNOSIS — N12 TUBULO-INTERSTITIAL NEPHRITIS, NOT SPECIFIED AS ACUTE OR CHRONIC: ICD-10-CM

## 2023-10-29 DIAGNOSIS — B34.1 ENTEROVIRUS INFECTION, UNSPECIFIED: ICD-10-CM

## 2023-10-29 LAB
ALBUMIN SERPL ELPH-MCNC: 3.6 G/DL — SIGNIFICANT CHANGE UP (ref 3.3–5)
ALBUMIN SERPL ELPH-MCNC: 3.6 G/DL — SIGNIFICANT CHANGE UP (ref 3.3–5)
ALP SERPL-CCNC: 62 U/L — SIGNIFICANT CHANGE UP (ref 30–120)
ALP SERPL-CCNC: 62 U/L — SIGNIFICANT CHANGE UP (ref 30–120)
ALT FLD-CCNC: 31 U/L — SIGNIFICANT CHANGE UP (ref 10–60)
ALT FLD-CCNC: 31 U/L — SIGNIFICANT CHANGE UP (ref 10–60)
ANION GAP SERPL CALC-SCNC: 11 MMOL/L — SIGNIFICANT CHANGE UP (ref 5–17)
ANION GAP SERPL CALC-SCNC: 11 MMOL/L — SIGNIFICANT CHANGE UP (ref 5–17)
APPEARANCE UR: ABNORMAL
APPEARANCE UR: ABNORMAL
APTT BLD: 41.9 SEC — HIGH (ref 24.5–35.6)
APTT BLD: 41.9 SEC — HIGH (ref 24.5–35.6)
AST SERPL-CCNC: 18 U/L — SIGNIFICANT CHANGE UP (ref 10–40)
AST SERPL-CCNC: 18 U/L — SIGNIFICANT CHANGE UP (ref 10–40)
BASOPHILS # BLD AUTO: 0.03 K/UL — SIGNIFICANT CHANGE UP (ref 0–0.2)
BASOPHILS # BLD AUTO: 0.03 K/UL — SIGNIFICANT CHANGE UP (ref 0–0.2)
BASOPHILS NFR BLD AUTO: 0.3 % — SIGNIFICANT CHANGE UP (ref 0–2)
BASOPHILS NFR BLD AUTO: 0.3 % — SIGNIFICANT CHANGE UP (ref 0–2)
BILIRUB SERPL-MCNC: 1 MG/DL — SIGNIFICANT CHANGE UP (ref 0.2–1.2)
BILIRUB SERPL-MCNC: 1 MG/DL — SIGNIFICANT CHANGE UP (ref 0.2–1.2)
BILIRUB UR-MCNC: NEGATIVE — SIGNIFICANT CHANGE UP
BILIRUB UR-MCNC: NEGATIVE — SIGNIFICANT CHANGE UP
BUN SERPL-MCNC: 8 MG/DL — SIGNIFICANT CHANGE UP (ref 7–23)
BUN SERPL-MCNC: 8 MG/DL — SIGNIFICANT CHANGE UP (ref 7–23)
CALCIUM SERPL-MCNC: 9.4 MG/DL — SIGNIFICANT CHANGE UP (ref 8.4–10.5)
CALCIUM SERPL-MCNC: 9.4 MG/DL — SIGNIFICANT CHANGE UP (ref 8.4–10.5)
CHLORIDE SERPL-SCNC: 99 MMOL/L — SIGNIFICANT CHANGE UP (ref 96–108)
CHLORIDE SERPL-SCNC: 99 MMOL/L — SIGNIFICANT CHANGE UP (ref 96–108)
CO2 SERPL-SCNC: 24 MMOL/L — SIGNIFICANT CHANGE UP (ref 22–31)
CO2 SERPL-SCNC: 24 MMOL/L — SIGNIFICANT CHANGE UP (ref 22–31)
COLOR SPEC: YELLOW — SIGNIFICANT CHANGE UP
COLOR SPEC: YELLOW — SIGNIFICANT CHANGE UP
CREAT SERPL-MCNC: 0.72 MG/DL — SIGNIFICANT CHANGE UP (ref 0.5–1.3)
CREAT SERPL-MCNC: 0.72 MG/DL — SIGNIFICANT CHANGE UP (ref 0.5–1.3)
DIFF PNL FLD: ABNORMAL
DIFF PNL FLD: ABNORMAL
EGFR: 117 ML/MIN/1.73M2 — SIGNIFICANT CHANGE UP
EGFR: 117 ML/MIN/1.73M2 — SIGNIFICANT CHANGE UP
EOSINOPHIL # BLD AUTO: 0 K/UL — SIGNIFICANT CHANGE UP (ref 0–0.5)
EOSINOPHIL # BLD AUTO: 0 K/UL — SIGNIFICANT CHANGE UP (ref 0–0.5)
EOSINOPHIL NFR BLD AUTO: 0 % — SIGNIFICANT CHANGE UP (ref 0–6)
EOSINOPHIL NFR BLD AUTO: 0 % — SIGNIFICANT CHANGE UP (ref 0–6)
GLUCOSE SERPL-MCNC: 125 MG/DL — HIGH (ref 70–99)
GLUCOSE SERPL-MCNC: 125 MG/DL — HIGH (ref 70–99)
GLUCOSE UR QL: NEGATIVE MG/DL — SIGNIFICANT CHANGE UP
GLUCOSE UR QL: NEGATIVE MG/DL — SIGNIFICANT CHANGE UP
HCG UR QL: NEGATIVE — SIGNIFICANT CHANGE UP
HCG UR QL: NEGATIVE — SIGNIFICANT CHANGE UP
HCT VFR BLD CALC: 37.2 % — SIGNIFICANT CHANGE UP (ref 34.5–45)
HCT VFR BLD CALC: 37.2 % — SIGNIFICANT CHANGE UP (ref 34.5–45)
HGB BLD-MCNC: 12.1 G/DL — SIGNIFICANT CHANGE UP (ref 11.5–15.5)
HGB BLD-MCNC: 12.1 G/DL — SIGNIFICANT CHANGE UP (ref 11.5–15.5)
IMM GRANULOCYTES NFR BLD AUTO: 0.3 % — SIGNIFICANT CHANGE UP (ref 0–0.9)
IMM GRANULOCYTES NFR BLD AUTO: 0.3 % — SIGNIFICANT CHANGE UP (ref 0–0.9)
INR BLD: 1.34 RATIO — HIGH (ref 0.85–1.18)
INR BLD: 1.34 RATIO — HIGH (ref 0.85–1.18)
KETONES UR-MCNC: 15 MG/DL
KETONES UR-MCNC: 15 MG/DL
LACTATE SERPL-SCNC: 0.8 MMOL/L — SIGNIFICANT CHANGE UP (ref 0.7–2)
LACTATE SERPL-SCNC: 0.8 MMOL/L — SIGNIFICANT CHANGE UP (ref 0.7–2)
LEUKOCYTE ESTERASE UR-ACNC: ABNORMAL
LEUKOCYTE ESTERASE UR-ACNC: ABNORMAL
LYMPHOCYTES # BLD AUTO: 0.7 K/UL — LOW (ref 1–3.3)
LYMPHOCYTES # BLD AUTO: 0.7 K/UL — LOW (ref 1–3.3)
LYMPHOCYTES # BLD AUTO: 7.8 % — LOW (ref 13–44)
LYMPHOCYTES # BLD AUTO: 7.8 % — LOW (ref 13–44)
MCHC RBC-ENTMCNC: 27.1 PG — SIGNIFICANT CHANGE UP (ref 27–34)
MCHC RBC-ENTMCNC: 27.1 PG — SIGNIFICANT CHANGE UP (ref 27–34)
MCHC RBC-ENTMCNC: 32.5 GM/DL — SIGNIFICANT CHANGE UP (ref 32–36)
MCHC RBC-ENTMCNC: 32.5 GM/DL — SIGNIFICANT CHANGE UP (ref 32–36)
MCV RBC AUTO: 83.4 FL — SIGNIFICANT CHANGE UP (ref 80–100)
MCV RBC AUTO: 83.4 FL — SIGNIFICANT CHANGE UP (ref 80–100)
MONOCYTES # BLD AUTO: 0.71 K/UL — SIGNIFICANT CHANGE UP (ref 0–0.9)
MONOCYTES # BLD AUTO: 0.71 K/UL — SIGNIFICANT CHANGE UP (ref 0–0.9)
MONOCYTES NFR BLD AUTO: 7.9 % — SIGNIFICANT CHANGE UP (ref 2–14)
MONOCYTES NFR BLD AUTO: 7.9 % — SIGNIFICANT CHANGE UP (ref 2–14)
NEUTROPHILS # BLD AUTO: 7.49 K/UL — HIGH (ref 1.8–7.4)
NEUTROPHILS # BLD AUTO: 7.49 K/UL — HIGH (ref 1.8–7.4)
NEUTROPHILS NFR BLD AUTO: 83.7 % — HIGH (ref 43–77)
NEUTROPHILS NFR BLD AUTO: 83.7 % — HIGH (ref 43–77)
NITRITE UR-MCNC: POSITIVE
NITRITE UR-MCNC: POSITIVE
NRBC # BLD: 0 /100 WBCS — SIGNIFICANT CHANGE UP (ref 0–0)
NRBC # BLD: 0 /100 WBCS — SIGNIFICANT CHANGE UP (ref 0–0)
PH UR: 5.5 — SIGNIFICANT CHANGE UP (ref 5–8)
PH UR: 5.5 — SIGNIFICANT CHANGE UP (ref 5–8)
PLATELET # BLD AUTO: 279 K/UL — SIGNIFICANT CHANGE UP (ref 150–400)
PLATELET # BLD AUTO: 279 K/UL — SIGNIFICANT CHANGE UP (ref 150–400)
POTASSIUM SERPL-MCNC: 3.4 MMOL/L — LOW (ref 3.5–5.3)
POTASSIUM SERPL-MCNC: 3.4 MMOL/L — LOW (ref 3.5–5.3)
POTASSIUM SERPL-SCNC: 3.4 MMOL/L — LOW (ref 3.5–5.3)
POTASSIUM SERPL-SCNC: 3.4 MMOL/L — LOW (ref 3.5–5.3)
PROT SERPL-MCNC: 8.8 G/DL — HIGH (ref 6–8.3)
PROT SERPL-MCNC: 8.8 G/DL — HIGH (ref 6–8.3)
PROT UR-MCNC: 30 MG/DL
PROT UR-MCNC: 30 MG/DL
PROTHROM AB SERPL-ACNC: 14.9 SEC — HIGH (ref 9.5–13)
PROTHROM AB SERPL-ACNC: 14.9 SEC — HIGH (ref 9.5–13)
RAPID RVP RESULT: DETECTED
RAPID RVP RESULT: DETECTED
RBC # BLD: 4.46 M/UL — SIGNIFICANT CHANGE UP (ref 3.8–5.2)
RBC # BLD: 4.46 M/UL — SIGNIFICANT CHANGE UP (ref 3.8–5.2)
RBC # FLD: 13.6 % — SIGNIFICANT CHANGE UP (ref 10.3–14.5)
RBC # FLD: 13.6 % — SIGNIFICANT CHANGE UP (ref 10.3–14.5)
RV+EV RNA SPEC QL NAA+PROBE: DETECTED
RV+EV RNA SPEC QL NAA+PROBE: DETECTED
SARS-COV-2 RNA SPEC QL NAA+PROBE: SIGNIFICANT CHANGE UP
SARS-COV-2 RNA SPEC QL NAA+PROBE: SIGNIFICANT CHANGE UP
SODIUM SERPL-SCNC: 134 MMOL/L — LOW (ref 135–145)
SODIUM SERPL-SCNC: 134 MMOL/L — LOW (ref 135–145)
SP GR SPEC: 1.01 — SIGNIFICANT CHANGE UP (ref 1–1.03)
SP GR SPEC: 1.01 — SIGNIFICANT CHANGE UP (ref 1–1.03)
UROBILINOGEN FLD QL: 1 MG/DL — SIGNIFICANT CHANGE UP (ref 0.2–1)
UROBILINOGEN FLD QL: 1 MG/DL — SIGNIFICANT CHANGE UP (ref 0.2–1)
WBC # BLD: 8.96 K/UL — SIGNIFICANT CHANGE UP (ref 3.8–10.5)
WBC # BLD: 8.96 K/UL — SIGNIFICANT CHANGE UP (ref 3.8–10.5)
WBC # FLD AUTO: 8.96 K/UL — SIGNIFICANT CHANGE UP (ref 3.8–10.5)
WBC # FLD AUTO: 8.96 K/UL — SIGNIFICANT CHANGE UP (ref 3.8–10.5)

## 2023-10-29 PROCEDURE — 96365 THER/PROPH/DIAG IV INF INIT: CPT

## 2023-10-29 PROCEDURE — 93010 ELECTROCARDIOGRAM REPORT: CPT

## 2023-10-29 PROCEDURE — 87040 BLOOD CULTURE FOR BACTERIA: CPT

## 2023-10-29 PROCEDURE — 36415 COLL VENOUS BLD VENIPUNCTURE: CPT

## 2023-10-29 PROCEDURE — 83605 ASSAY OF LACTIC ACID: CPT

## 2023-10-29 PROCEDURE — 81025 URINE PREGNANCY TEST: CPT

## 2023-10-29 PROCEDURE — 87086 URINE CULTURE/COLONY COUNT: CPT

## 2023-10-29 PROCEDURE — 87186 SC STD MICRODIL/AGAR DIL: CPT

## 2023-10-29 PROCEDURE — 74177 CT ABD & PELVIS W/CONTRAST: CPT | Mod: MA

## 2023-10-29 PROCEDURE — 93005 ELECTROCARDIOGRAM TRACING: CPT

## 2023-10-29 PROCEDURE — 70450 CT HEAD/BRAIN W/O DYE: CPT | Mod: MA

## 2023-10-29 PROCEDURE — 71045 X-RAY EXAM CHEST 1 VIEW: CPT

## 2023-10-29 PROCEDURE — 81001 URINALYSIS AUTO W/SCOPE: CPT

## 2023-10-29 PROCEDURE — 99285 EMERGENCY DEPT VISIT HI MDM: CPT | Mod: 25

## 2023-10-29 PROCEDURE — 96375 TX/PRO/DX INJ NEW DRUG ADDON: CPT

## 2023-10-29 PROCEDURE — 85610 PROTHROMBIN TIME: CPT

## 2023-10-29 PROCEDURE — 0225U NFCT DS DNA&RNA 21 SARSCOV2: CPT

## 2023-10-29 PROCEDURE — 74177 CT ABD & PELVIS W/CONTRAST: CPT | Mod: 26,MA

## 2023-10-29 PROCEDURE — 80053 COMPREHEN METABOLIC PANEL: CPT

## 2023-10-29 PROCEDURE — 85025 COMPLETE CBC W/AUTO DIFF WBC: CPT

## 2023-10-29 PROCEDURE — 71045 X-RAY EXAM CHEST 1 VIEW: CPT | Mod: 26

## 2023-10-29 PROCEDURE — 96376 TX/PRO/DX INJ SAME DRUG ADON: CPT

## 2023-10-29 PROCEDURE — 99285 EMERGENCY DEPT VISIT HI MDM: CPT

## 2023-10-29 PROCEDURE — 96367 TX/PROPH/DG ADDL SEQ IV INF: CPT

## 2023-10-29 PROCEDURE — 85730 THROMBOPLASTIN TIME PARTIAL: CPT

## 2023-10-29 PROCEDURE — 70450 CT HEAD/BRAIN W/O DYE: CPT | Mod: 26,MA

## 2023-10-29 RX ORDER — SODIUM CHLORIDE 9 MG/ML
1000 INJECTION INTRAMUSCULAR; INTRAVENOUS; SUBCUTANEOUS ONCE
Refills: 0 | Status: COMPLETED | OUTPATIENT
Start: 2023-10-29 | End: 2023-10-29

## 2023-10-29 RX ORDER — ONDANSETRON 8 MG/1
4 TABLET, FILM COATED ORAL ONCE
Refills: 0 | Status: COMPLETED | OUTPATIENT
Start: 2023-10-29 | End: 2023-10-29

## 2023-10-29 RX ORDER — LANOLIN ALCOHOL/MO/W.PET/CERES
3 CREAM (GRAM) TOPICAL AT BEDTIME
Refills: 0 | Status: DISCONTINUED | OUTPATIENT
Start: 2023-10-29 | End: 2023-10-29

## 2023-10-29 RX ORDER — SODIUM CHLORIDE 9 MG/ML
1000 INJECTION INTRAMUSCULAR; INTRAVENOUS; SUBCUTANEOUS
Refills: 0 | Status: DISCONTINUED | OUTPATIENT
Start: 2023-10-29 | End: 2023-10-29

## 2023-10-29 RX ORDER — ACETAMINOPHEN 500 MG
650 TABLET ORAL EVERY 6 HOURS
Refills: 0 | Status: DISCONTINUED | OUTPATIENT
Start: 2023-10-29 | End: 2023-10-29

## 2023-10-29 RX ORDER — ONDANSETRON 8 MG/1
4 TABLET, FILM COATED ORAL EVERY 6 HOURS
Refills: 0 | Status: DISCONTINUED | OUTPATIENT
Start: 2023-10-29 | End: 2023-10-29

## 2023-10-29 RX ORDER — ACETAMINOPHEN 500 MG
650 TABLET ORAL EVERY 6 HOURS
Refills: 0 | Status: DISCONTINUED | OUTPATIENT
Start: 2023-10-29 | End: 2023-11-02

## 2023-10-29 RX ORDER — INFLUENZA VIRUS VACCINE 15; 15; 15; 15 UG/.5ML; UG/.5ML; UG/.5ML; UG/.5ML
0.5 SUSPENSION INTRAMUSCULAR ONCE
Refills: 0 | Status: DISCONTINUED | OUTPATIENT
Start: 2023-10-29 | End: 2023-10-29

## 2023-10-29 RX ORDER — SODIUM CHLORIDE 9 MG/ML
1900 INJECTION INTRAMUSCULAR; INTRAVENOUS; SUBCUTANEOUS ONCE
Refills: 0 | Status: COMPLETED | OUTPATIENT
Start: 2023-10-29 | End: 2023-10-29

## 2023-10-29 RX ORDER — KETOROLAC TROMETHAMINE 30 MG/ML
30 SYRINGE (ML) INJECTION ONCE
Refills: 0 | Status: DISCONTINUED | OUTPATIENT
Start: 2023-10-29 | End: 2023-10-29

## 2023-10-29 RX ORDER — CIPROFLOXACIN LACTATE 400MG/40ML
400 VIAL (ML) INTRAVENOUS EVERY 12 HOURS
Refills: 0 | Status: DISCONTINUED | OUTPATIENT
Start: 2023-10-29 | End: 2023-10-29

## 2023-10-29 RX ORDER — LANOLIN ALCOHOL/MO/W.PET/CERES
3 CREAM (GRAM) TOPICAL AT BEDTIME
Refills: 0 | Status: DISCONTINUED | OUTPATIENT
Start: 2023-10-29 | End: 2023-11-02

## 2023-10-29 RX ORDER — IBUPROFEN 200 MG
600 TABLET ORAL ONCE
Refills: 0 | Status: COMPLETED | OUTPATIENT
Start: 2023-10-29 | End: 2023-10-29

## 2023-10-29 RX ORDER — ONDANSETRON 8 MG/1
4 TABLET, FILM COATED ORAL EVERY 6 HOURS
Refills: 0 | Status: DISCONTINUED | OUTPATIENT
Start: 2023-10-29 | End: 2023-11-02

## 2023-10-29 RX ORDER — CIPROFLOXACIN LACTATE 400MG/40ML
400 VIAL (ML) INTRAVENOUS ONCE
Refills: 0 | Status: COMPLETED | OUTPATIENT
Start: 2023-10-29 | End: 2023-10-29

## 2023-10-29 RX ORDER — CIPROFLOXACIN LACTATE 400MG/40ML
400 VIAL (ML) INTRAVENOUS EVERY 12 HOURS
Refills: 0 | Status: DISCONTINUED | OUTPATIENT
Start: 2023-10-30 | End: 2023-10-31

## 2023-10-29 RX ORDER — ACETAMINOPHEN 500 MG
1000 TABLET ORAL ONCE
Refills: 0 | Status: COMPLETED | OUTPATIENT
Start: 2023-10-29 | End: 2023-10-29

## 2023-10-29 RX ORDER — SODIUM CHLORIDE 9 MG/ML
1000 INJECTION INTRAMUSCULAR; INTRAVENOUS; SUBCUTANEOUS
Refills: 0 | Status: DISCONTINUED | OUTPATIENT
Start: 2023-10-29 | End: 2023-11-02

## 2023-10-29 RX ADMIN — ONDANSETRON 4 MILLIGRAM(S): 8 TABLET, FILM COATED ORAL at 22:55

## 2023-10-29 RX ADMIN — Medication 30 MILLIGRAM(S): at 16:51

## 2023-10-29 RX ADMIN — SODIUM CHLORIDE 100 MILLILITER(S): 9 INJECTION INTRAMUSCULAR; INTRAVENOUS; SUBCUTANEOUS at 15:35

## 2023-10-29 RX ADMIN — Medication 400 MILLIGRAM(S): at 08:18

## 2023-10-29 RX ADMIN — SODIUM CHLORIDE 1900 MILLILITER(S): 9 INJECTION INTRAMUSCULAR; INTRAVENOUS; SUBCUTANEOUS at 06:41

## 2023-10-29 RX ADMIN — Medication 650 MILLIGRAM(S): at 15:32

## 2023-10-29 RX ADMIN — ONDANSETRON 4 MILLIGRAM(S): 8 TABLET, FILM COATED ORAL at 08:30

## 2023-10-29 RX ADMIN — Medication 1000 MILLIGRAM(S): at 07:20

## 2023-10-29 RX ADMIN — Medication 200 MILLIGRAM(S): at 17:09

## 2023-10-29 RX ADMIN — Medication 30 MILLIGRAM(S): at 17:06

## 2023-10-29 RX ADMIN — Medication 600 MILLIGRAM(S): at 23:02

## 2023-10-29 RX ADMIN — Medication 400 MILLIGRAM(S): at 06:42

## 2023-10-29 RX ADMIN — Medication 1000 MILLIGRAM(S): at 07:00

## 2023-10-29 RX ADMIN — SODIUM CHLORIDE 1000 MILLILITER(S): 9 INJECTION INTRAMUSCULAR; INTRAVENOUS; SUBCUTANEOUS at 12:07

## 2023-10-29 RX ADMIN — ONDANSETRON 4 MILLIGRAM(S): 8 TABLET, FILM COATED ORAL at 06:42

## 2023-10-29 RX ADMIN — SODIUM CHLORIDE 1000 MILLILITER(S): 9 INJECTION INTRAMUSCULAR; INTRAVENOUS; SUBCUTANEOUS at 14:30

## 2023-10-29 RX ADMIN — Medication 600 MILLIGRAM(S): at 23:49

## 2023-10-29 RX ADMIN — SODIUM CHLORIDE 1900 MILLILITER(S): 9 INJECTION INTRAMUSCULAR; INTRAVENOUS; SUBCUTANEOUS at 07:40

## 2023-10-29 RX ADMIN — Medication 200 MILLIGRAM(S): at 07:18

## 2023-10-29 RX ADMIN — Medication 650 MILLIGRAM(S): at 16:00

## 2023-10-29 RX ADMIN — SODIUM CHLORIDE 100 MILLILITER(S): 9 INJECTION INTRAMUSCULAR; INTRAVENOUS; SUBCUTANEOUS at 22:54

## 2023-10-29 NOTE — ED ADULT NURSE NOTE - NSFALLUNIVINTERV_ED_ALL_ED
Bed/Stretcher in lowest position, wheels locked, appropriate side rails in place/Call bell, personal items and telephone in reach/Instruct patient to call for assistance before getting out of bed/chair/stretcher/Non-slip footwear applied when patient is off stretcher/[place] to call system/Physically safe environment - no spills, clutter or unnecessary equipment/Purposeful proactive rounding/Room/bathroom lighting operational, light cord in reach

## 2023-10-29 NOTE — ED ADULT NURSE REASSESSMENT NOTE - NS ED NURSE REASSESS COMMENT FT1
@1100am pt c/o nausea / vomiting Dr. wilson aware, awaiting further orders
report received @ 715 , pt resting comfortably in no distress, improvement noted, Respirations are even and unlabored. plan of care ongoing, no further concerns as of present, no acute changes noted, needs attended to, safety maintained, call bell within reach.

## 2023-10-29 NOTE — H&P ADULT - HISTORY OF PRESENT ILLNESS
This is a 28 F with no PMH who comes in c/o fevers 102-103 mostly at night for the past four days associated with n/v, chills, back pain, headache with cough, runny nose. Patient denies dysuria, urinary hesitancy, increased frequency or flank pain. At home, everyone has a URI.

## 2023-10-29 NOTE — H&P ADULT - PROBLEM SELECTOR PLAN 1
Admit  Pan-culture  Continue IV Cipro  Monitor CBC and culture data  ID consult  Further work-up/management pending clinical course.

## 2023-10-29 NOTE — ED PROVIDER NOTE - PATIENT'S GENDER IDENTITY
Physician Progress Note      PATIENTJas Velasquez  CSN #:                  [id number]  :                       1940  ADMIT DATE:       2021 4:50 PM  [address] [place] DATE:  RESPONDING  PROVIDER #:        Kenneth Irizarry MD          QUERY TEXT:    Pt admitted with colon cancer. Noted documentation of moderate PCM per   dietitian consultant. If possible, please document in progress notes and   discharge summary:    The medical record reflects the following:  Risk Factors: noted PCM  Clinical Indicators: 1/2 dietitian note- Moderate malnutrition related to   inadequate protein-energy intake,altered GI function,altered GI structure as   evidenced by poor intake prior to admission,weight loss,lab values,GI   abnormality,nausea,other (comment),NPO or clear liquid status due to medical   condition,mild muscle loss (POD#2 s/p ex lap with diverting loop colostomy +   peritoneal biopsy of obstructing retrosigmoid mass)  Treatment: Add Ensure Clear with all meals, monitor labs, monitor weights, I&O    Thank You Zoila Rodrigues RN, BORA [email or web address]  Options provided:  -- Moderate PCM confirmed present on admission  -- Moderate PCM ruled out  -- Other - I will add my own diagnosis  -- Disagree - Not applicable / Not valid  -- Disagree - Clinically unable to determine / Unknown  -- Refer to Clinical Documentation Reviewer    PROVIDER RESPONSE TEXT:    The diagnosis of moderate PCM was confirmed as present on admission.     Query created by: Lasha Moore on 2022 2:14 PM      Electronically signed by:  Kenneth Irizarry MD 2022 6:29 PM Female

## 2023-10-29 NOTE — PATIENT PROFILE ADULT - FALL HARM RISK - UNIVERSAL INTERVENTIONS
Bed in lowest position, wheels locked, appropriate side rails in place/Call bell, personal items and telephone in reach/Instruct patient to call for assistance before getting out of bed or chair/Non-slip footwear when patient is out of bed/[place] to call system/Physically safe environment - no spills, clutter or unnecessary equipment/Purposeful Proactive Rounding/Room/bathroom lighting operational, light cord in reach

## 2023-10-29 NOTE — ED PROVIDER NOTE - OBJECTIVE STATEMENT
28 y F c/o 4d fever, mostly at night, associated with chills, frontal HA, body aches, decreased appetite, decreased taste 28 y F c/o 4d fever, mostly at night, associated with chills, frontal HA, body aches, decreased appetite, decreased taste, no neck pain, some improvement with otc antipyretics but not lasting, had negative covid test, vomited once a few days ago, hardly eating since, some nausea, no diarrhea, no urinary symptoms, no abd pain

## 2023-10-29 NOTE — CONSULT NOTE ADULT - ASSESSMENT
28f with obesity presents with c/o fever, chills, back pain, headache  photophobia and vomiting  ct with right perinephric stranding  ua pyuria  right pyelonephritis  enterovirus - supportive care    plan  follow urine cx   follow blood cx    pt with pcn and cefazolin allergy so   cont cipro 400 q 12

## 2023-10-29 NOTE — CONSULT NOTE ADULT - SUBJECTIVE AND OBJECTIVE BOX
[facility], Division of Infectious Diseases  BRENTON Jalloh S. Shah, Y. Patel, G. [facility]   [phone]  after hours and weekends [phone]    CARMEN ROJAS  28y, Female  [id number]    HPI:  28f with obesity presents with c/o fever, chills, back pain, headache  photophobia and vomiting  states symptoms started 3 days ago, no dysuria no urgency, no frequency,    back pain is in the center not sides   had loose stool earlier in the week.   no similar symptoms  no recent antibx  at home everyone has cold    PMH/PSH--  No pertinent past medical history  No significant past surgical history        Allergies--pcn, cefazolin      Medications--  Antibiotics: ciprofloxacin   IVPB 400 milliGRAM(s) IV Intermittent every 12 hours    Immunologic: influenza   Vaccine 0.5 milliLiter(s) IntraMuscular once    Other: acetaminophen     Tablet .. PRN  aluminum hydroxide/magnesium hydroxide/simethicone Suspension PRN  melatonin PRN  ondansetron Injectable PRN  sodium chloride 0.9%.      Social History--  EtOH: denies ***  Tobacco: denies ***  Drug Use: denies ***    Family/Marital History--  FH: asthma (Mother)      Travel/Environmental/Occupational History:  works for [employer]    Review of Systems:  REVIEW OF SYSTEMS  General: no fever, + chills, no wt loss	  Ophthalmologic: no blurry vision  Respiratory and Thorax: no cough, no dyspnea  Cardiovascular: no chest pain, no palpitations  Gastrointestinal:  no nausea, + vomiting, diarrhea  Genitourinary: no dysuria, no urgency, no frequency	  Musculoskeletal: no myalgias	  Neurological:  + headache	    Physical Exam--  Vital Signs: T(F): 98.8 (10-29-23 @ 15:19), Max: 102.9 (10-29-23 @ 05:33)  HR: 99 (10-29-23 @ 15:19)  BP: 115/75 (10-29-23 @ 15:19)  RR: 18 (10-29-23 @ 15:19)  SpO2: 97% (10-29-23 @ 15:19)  Wt(kg): --  General: in distress cloth over face   HEENT: AT/NC.   Neck: Not rigid. No sense of mass.  Nodes: None palpable.  Lungs: Clear bilaterally without rales, wheezing or rhonchi  Heart: Regular rate and rhythm. No Murmur.   Abdomen: Bowel sounds present and normoactive. Soft. Nondistended. Nontender. .  Back: No spinal tenderness. No costovertebral angle tenderness.   Extremities: No cyanosis or clubbing. No edema.   Skin: Warm. Dry. Good turgor. No rash. No vasculitic stigmata.  Psychiatric: Appropriate affect and mood for situation.         Laboratory & Imaging Data--  CBC                        12.1   8.96  )-----------( 279      ( 29 Oct 2023 06:25 )             37.2       Chemistries  10-29    134<L>  |  99  |  8   ----------------------------<  125<H>  3.4<L>   |  24  |  0.72    Ca    9.4      29 Oct 2023 06:25    TPro  8.8<H>  /  Alb  3.6  /  TBili  1.0  /  DBili  x   /  AST  18  /  ALT  31  /  AlkPhos  62  10-29      Culture Data      < from: CT Abdomen and Pelvis w/ IV Cont (10.29.23 @ 08:44) >  CONTRAST/COMPLICATIONS:  IV Contrast: Omnipaque 350  90 cc administered   10 cc discarded  Oral Contrast: None  Complications: None    PROCEDURE:  CT of the Abdomen and Pelvis was performed.  Sagittal and coronal reformats were performed.    FINDINGS:  LOWER CHEST: No significant abnormality.    LIVER: Borderline in size. Mild diffuse fatty infiltration.  BILE DUCTS: Normal in caliber.  GALLBLADDER: No intraluminal calcification or pericholecystic fluid  SPLEEN: Normal in size and configuration.  PANCREAS:Unremarkable.  ADRENALS: No mass.  KIDNEYS/URETERS: Subtle area of focal right upper pole hypoenhancement   (5-80 and 4-60). Mild right perinephric and periureteral stranding.   Slight prominence right renal pelvis and proximal-mid ureter. No renal or   ureteral calcifications.    BLADDER: Grossly unremarkable.  REPRODUCTIVE ORGANS: Midline uterus. 9 x 6.5 x 7 cm cystic lesion   anterior to uterus and superior to urinary bladder.    BOWEL: No acute pathology or bowel obstruction. Appendix normal in   caliber.  PERITONEUM: No ascites or free air.  VESSELS: Normal in caliber.  RETROPERITONEUM/LYMPH NODES: No lymphadenopathy.  ABDOMINAL WALL: No abnormal mass or fluid collection.  BONES: No acute fracture or focal destructive or blastic lesion.    IMPRESSION:    Findings compatible with right-sided pyelonephritis.    9 x 6.5 x 7 cm cystic lesion anterior to uterus and superior to urinary   bladder, presumably ovarian. Follow-up pelvic ultrasound is recommended   for further evaluation in this regard.    --- End of Report ---    < end of copied text >  Urine Microscopic-Add On (NC) (10.29.23 @ 06:25)   Red Blood Cell - Urine: 10 /HPF  White Blood Cell - Urine: >50 /HPF  Hyaline Casts: 0-2 /HPF

## 2023-10-29 NOTE — CHART NOTE - NSCHARTNOTEFT_GEN_A_CORE
Patient seen and examined in [place] [facility]. Patient being transferred to [facility] due to bed availability and patient may require MRI brain if headache doesn't improve. Patient being admitted for acute pyelonephritis. Refer to [place] visit for full H&P.

## 2023-10-29 NOTE — ED ADULT NURSE NOTE - NS ED NURSE RECORDS SENT
[address]  [facility] EMERGENCY DEPT  [address] [id number] [phone]    Work/School Note    Date: 9/19/2022    To Whom It May concern:    Luis Biggs was seen and treated today in the emergency room by the following provider(s):  Attending Provider: Sophia Alvarez DO  Nurse Practitioner: Kezia Varela NP. Luis Biggs is excused from work/school on 9/19/2022 through 9/21/2022. He is medically clear to return to work/school on 9/22/2022.          Sincerely,          Gerardo Mcconnell NP Medical Records sent

## 2023-10-29 NOTE — ED PROVIDER NOTE - PROGRESS NOTE DETAILS
discussed urine results with pt, recalls a couple of days ago did have some discomfort/urgency, no burning, but it felt improved, so thought was better, given allergies d/w pt and family will start FQ - discussed adverse reaction re tendon, but given presentation and age, will give cipro, pt/family agree, believe she's taken it before without incident Patient with pyelonephritis on CT. patient not tolerting PO, nausea and vomiting. Also positive for entero/rhino virus. case discussed with Dr. SHARON Choi covering for Dr. Sevilla, accepts gilda. Results discussed, copy provided, agreeable with plan. Patient with pyelonephritis on CT. patient not tolerating PO, nausea and vomiting. Also positive for entero/rhino virus. case discussed with Dr. SHARON Choi covering for Dr. Sevilla, accepts admission. Results discussed, copy provided, agreeable with plan.

## 2023-10-29 NOTE — ED PROVIDER NOTE - NS ED ATTENDING STATEMENT MOD
"  Physical Therapy Treatment Note     Name: Christina Tomlinson  Clinic Number: [id number]    Therapy Diagnosis:   Encounter Diagnoses   Name Primary?    Chronic pain of both shoulders     Decreased ROM of right shoulder     Decreased ROM of left shoulder     Muscle weakness of left arm     Muscle weakness of right arm      Physician: Everette Aguirre MD    Visit Date: 6/29/2020    Physician Orders: PT Eval and Treat   Medical Diagnosis from Referral:   M75.101 (ICD-10-CM) - Unspecified rotator cuff tear or rupture of right shoulder, not specified as traumatic   M12.811 (ICD-10-CM) - Other specific arthropathies, not elsewhere classified, right shoulder   M12.812 (ICD-10-CM) - Other specific arthropathies, not elsewhere classified, left shoulder   M75.102 (ICD-10-CM) - Unspecified rotator cuff tear or rupture of left shoulder, not specified as traumatic   Evaluation Date: 6/24/2020  Authorization Period Expiration: 08/19/2020  Plan of Care Expiration: 8/24/2020  Visit # / Visits authorized: 3/ 12    Time In: 1:15 pm  Time Out:2:00 pm  Total Billable Time: 43 minutes    Precautions: Standard    Subjective     Pt reports: hurting this afternoon, but did her exercises over the weekend  She was compliant with home exercise program.  Response to previous treatment: slight increase in soreness  Functional change: none at this time    Pain: 7/10  Location: bilateral shoulder  L>R    Objective     Christina received therapeutic exercises to develop strength, ROM and flexibility for 43 minutes including:    Date  6/29/2020 6/26/2020 6/24/2020   VISIT 3/12 2/12 1/12           POC EXP. DATE 8/24/2020 8/24/2020 8/24/2020   VISIT AMOUNT  MEDICARE TOTAL 90.96  264.80 60.64  173.84 113.20   FACE-TO-FACE 7/24/2020 7/24/2020 7/24/2020   FOTO 3/5 2/5 1/5           [place]ley for flexion 2' 2' next   TABLE:       Scapular retraction 15 x 3"  15 x 3" 10 x 3"   Wand flexion -- 1 x 10 1 x 10   Wand ER -- -- next   pec stretch -- -- next " "  Snow michael -- -- next   Post shoulder roll 1 x 15 1 x 10     Table slides        flex 1 x 15 1 x 10      abd 1 x 15 1 x 10      ER 1 x 15 1 x 10     STANDING:       pec stretch next next next   row next next next   [place]'s next next next   [place]'s next next next   Wall walks 1 x 5      Mod. Child's pose 3 x 5"              Initials DG DG MA       Home Exercises Provided and Patient Education Provided     Education provided:   - continued compliance with HEP    Written Home Exercises Provided: yes.  Exercises were reviewed and Christina was able to demonstrate them prior to the end of the session.  Christina demonstrated good  understanding of the education provided.     See EMR under Patient Instructions for exercises provided 6/26/2020.    Assessment     Christina was able to perform all of today's progressions and nw exercises with no increase in symptoms prior to leaving the clinic. She was able to demonstrate greater than 100 degrees flexion bilaterally during wall walks today. When performing modified child's pose in standing she required occasional verbal cues to keep her stretch comfortable and not push to the point of pain.   Christina is progressing well towards her goals.   Pt prognosis is Good.     Pt will continue to benefit from skilled outpatient physical therapy to address the deficits listed in the problem list box on initial evaluation, provide pt/family education and to maximize pt's level of independence in the home and community environment.     Pt's spiritual, cultural and educational needs considered and pt agreeable to plan of care and goals.     Anticipated barriers to physical therapy: none    Goals:   Short Term Goals:  4 weeks  1. Patient will be compliant with HEP to promote the independent management of current diagnosis. In progress, not met  2. Patient will increase bilateral shoulder flexion to 110 degrees for function of grooming. In progress, not met  3. Patient will increase bilateral " shoulder functiona ER to reach C7. In progress, not met  4. Patient will report a decrease in complaints of bilateral shoulder pain to 5/10 during performance of ADL's for independence of self care activities.  In progress, not met        Long Term Goals:  8 weeks  1. Patient will increase bilateral shoulder strength to 4/5 to improve tolerance to normal house work.. In progress, not met  2. Patient will increase bilateral shoulder flexion to 125 degrees in order to place dishes in overhead cabinets independently for self care independence.  In progress, not met  3. Patient will increase bilateral shoulder functional IR to reach L3 for function of dressing. In progress, not met  4. Patient will increase scapular stabilization strength to 4/5 to improve tolerance to normal lifting. In progress, not met  5. Patient will improve FOTO limitation status from 47% to 36%  indicating increased functional mobility. In progress, not met  Plan     Continue with the plan of care. Increase reps with wall walks and begin [place]'s next visit.     Cora Degroot, PT      Attending Only

## 2023-10-29 NOTE — ED PROVIDER NOTE - CLINICAL SUMMARY MEDICAL DECISION MAKING FREE TEXT BOX
28 y.o. F c/o 4d fever, few episodes vomiting, decreased appetite, body aches, HA - iv, labs, lactate, cultures, ct head, ivf, iv tylenol/antiemetic, rvp

## 2023-10-30 ENCOUNTER — TRANSCRIPTION ENCOUNTER (OUTPATIENT)
Age: 28
End: 2023-10-30

## 2023-10-30 DIAGNOSIS — R51.9 HEADACHE, UNSPECIFIED: ICD-10-CM

## 2023-10-30 DIAGNOSIS — N83.209 UNSPECIFIED OVARIAN CYST, UNSPECIFIED SIDE: ICD-10-CM

## 2023-10-30 LAB
ANION GAP SERPL CALC-SCNC: 7 MMOL/L — SIGNIFICANT CHANGE UP (ref 5–17)
ANION GAP SERPL CALC-SCNC: 7 MMOL/L — SIGNIFICANT CHANGE UP (ref 5–17)
BUN SERPL-MCNC: 5 MG/DL — LOW (ref 7–23)
BUN SERPL-MCNC: 5 MG/DL — LOW (ref 7–23)
CALCIUM SERPL-MCNC: 8.1 MG/DL — LOW (ref 8.5–10.1)
CALCIUM SERPL-MCNC: 8.1 MG/DL — LOW (ref 8.5–10.1)
CHLORIDE SERPL-SCNC: 109 MMOL/L — HIGH (ref 96–108)
CHLORIDE SERPL-SCNC: 109 MMOL/L — HIGH (ref 96–108)
CO2 SERPL-SCNC: 22 MMOL/L — SIGNIFICANT CHANGE UP (ref 22–31)
CO2 SERPL-SCNC: 22 MMOL/L — SIGNIFICANT CHANGE UP (ref 22–31)
CREAT SERPL-MCNC: 0.54 MG/DL — SIGNIFICANT CHANGE UP (ref 0.5–1.3)
CREAT SERPL-MCNC: 0.54 MG/DL — SIGNIFICANT CHANGE UP (ref 0.5–1.3)
EGFR: 129 ML/MIN/1.73M2 — SIGNIFICANT CHANGE UP
EGFR: 129 ML/MIN/1.73M2 — SIGNIFICANT CHANGE UP
GLUCOSE SERPL-MCNC: 108 MG/DL — HIGH (ref 70–99)
GLUCOSE SERPL-MCNC: 108 MG/DL — HIGH (ref 70–99)
HCT VFR BLD CALC: 32.9 % — LOW (ref 34.5–45)
HCT VFR BLD CALC: 32.9 % — LOW (ref 34.5–45)
HGB BLD-MCNC: 10.6 G/DL — LOW (ref 11.5–15.5)
HGB BLD-MCNC: 10.6 G/DL — LOW (ref 11.5–15.5)
MAGNESIUM SERPL-MCNC: 2.1 MG/DL — SIGNIFICANT CHANGE UP (ref 1.6–2.6)
MAGNESIUM SERPL-MCNC: 2.1 MG/DL — SIGNIFICANT CHANGE UP (ref 1.6–2.6)
MCHC RBC-ENTMCNC: 27.5 PG — SIGNIFICANT CHANGE UP (ref 27–34)
MCHC RBC-ENTMCNC: 27.5 PG — SIGNIFICANT CHANGE UP (ref 27–34)
MCHC RBC-ENTMCNC: 32.2 GM/DL — SIGNIFICANT CHANGE UP (ref 32–36)
MCHC RBC-ENTMCNC: 32.2 GM/DL — SIGNIFICANT CHANGE UP (ref 32–36)
MCV RBC AUTO: 85.2 FL — SIGNIFICANT CHANGE UP (ref 80–100)
MCV RBC AUTO: 85.2 FL — SIGNIFICANT CHANGE UP (ref 80–100)
NRBC # BLD: 0 /100 WBCS — SIGNIFICANT CHANGE UP (ref 0–0)
NRBC # BLD: 0 /100 WBCS — SIGNIFICANT CHANGE UP (ref 0–0)
PLATELET # BLD AUTO: 231 K/UL — SIGNIFICANT CHANGE UP (ref 150–400)
PLATELET # BLD AUTO: 231 K/UL — SIGNIFICANT CHANGE UP (ref 150–400)
POTASSIUM SERPL-MCNC: 3.6 MMOL/L — SIGNIFICANT CHANGE UP (ref 3.5–5.3)
POTASSIUM SERPL-MCNC: 3.6 MMOL/L — SIGNIFICANT CHANGE UP (ref 3.5–5.3)
POTASSIUM SERPL-SCNC: 3.6 MMOL/L — SIGNIFICANT CHANGE UP (ref 3.5–5.3)
POTASSIUM SERPL-SCNC: 3.6 MMOL/L — SIGNIFICANT CHANGE UP (ref 3.5–5.3)
PROCALCITONIN SERPL-MCNC: 0.62 NG/ML — HIGH
PROCALCITONIN SERPL-MCNC: 0.62 NG/ML — HIGH
RBC # BLD: 3.86 M/UL — SIGNIFICANT CHANGE UP (ref 3.8–5.2)
RBC # BLD: 3.86 M/UL — SIGNIFICANT CHANGE UP (ref 3.8–5.2)
RBC # FLD: 13.7 % — SIGNIFICANT CHANGE UP (ref 10.3–14.5)
RBC # FLD: 13.7 % — SIGNIFICANT CHANGE UP (ref 10.3–14.5)
SODIUM SERPL-SCNC: 138 MMOL/L — SIGNIFICANT CHANGE UP (ref 135–145)
SODIUM SERPL-SCNC: 138 MMOL/L — SIGNIFICANT CHANGE UP (ref 135–145)
WBC # BLD: 7.2 K/UL — SIGNIFICANT CHANGE UP (ref 3.8–10.5)
WBC # BLD: 7.2 K/UL — SIGNIFICANT CHANGE UP (ref 3.8–10.5)
WBC # FLD AUTO: 7.2 K/UL — SIGNIFICANT CHANGE UP (ref 3.8–10.5)
WBC # FLD AUTO: 7.2 K/UL — SIGNIFICANT CHANGE UP (ref 3.8–10.5)

## 2023-10-30 PROCEDURE — 70553 MRI BRAIN STEM W/O & W/DYE: CPT | Mod: 26

## 2023-10-30 RX ORDER — IBUPROFEN 200 MG
400 TABLET ORAL ONCE
Refills: 0 | Status: COMPLETED | OUTPATIENT
Start: 2023-10-30 | End: 2023-10-30

## 2023-10-30 RX ORDER — SUMATRIPTAN SUCCINATE 4 MG/.5ML
6 INJECTION, SOLUTION SUBCUTANEOUS ONCE
Refills: 0 | Status: COMPLETED | OUTPATIENT
Start: 2023-10-30 | End: 2023-10-30

## 2023-10-30 RX ORDER — ENOXAPARIN SODIUM 100 MG/ML
40 INJECTION SUBCUTANEOUS EVERY 24 HOURS
Refills: 0 | Status: DISCONTINUED | OUTPATIENT
Start: 2023-10-31 | End: 2023-11-02

## 2023-10-30 RX ADMIN — SUMATRIPTAN SUCCINATE 6 MILLIGRAM(S): 4 INJECTION, SOLUTION SUBCUTANEOUS at 15:32

## 2023-10-30 RX ADMIN — SODIUM CHLORIDE 100 MILLILITER(S): 9 INJECTION INTRAMUSCULAR; INTRAVENOUS; SUBCUTANEOUS at 11:23

## 2023-10-30 RX ADMIN — Medication 400 MILLIGRAM(S): at 05:53

## 2023-10-30 RX ADMIN — Medication 650 MILLIGRAM(S): at 07:20

## 2023-10-30 RX ADMIN — Medication 200 MILLIGRAM(S): at 05:55

## 2023-10-30 RX ADMIN — Medication 400 MILLIGRAM(S): at 21:02

## 2023-10-30 RX ADMIN — Medication 200 MILLIGRAM(S): at 17:16

## 2023-10-30 RX ADMIN — Medication 650 MILLIGRAM(S): at 18:57

## 2023-10-30 RX ADMIN — Medication 400 MILLIGRAM(S): at 21:42

## 2023-10-30 RX ADMIN — ONDANSETRON 4 MILLIGRAM(S): 8 TABLET, FILM COATED ORAL at 07:24

## 2023-10-30 RX ADMIN — Medication 400 MILLIGRAM(S): at 06:43

## 2023-10-30 NOTE — CARE COORDINATION ASSESSMENT. - NSCAREPROVIDERS_GEN_ALL_CORE_FT
CARE PROVIDERS:  Administration: Sudhakar Shaffer  Admitting: Juan Sevilla  Attending: Juan Sevilla  Case Management: Emily Cisneros  Consultant: Arianna Mejia  Covering Team: Les Choi  Nurse: Clayton Du  Nurse: Michelle Pyle  Nurse: Rica Moore  Override: Fallon De La Torre  Override: Hannah Read  Override: Armond Wiley  Override: Emmett Caballero  PCA/Nursing Assistant: Armond Wiley  PCA/Nursing Assistant: Hannah Read  PCA/Nursing Assistant: Fallon De La Torre  PCA/Nursing Assistant: Janice Dean  Primary Team: Marta Traylor  Primary Team: Isabella Joseph  Registered Dietitian: Camille Woodard  : Alma Richmond   CARE PROVIDERS:  Admitting: Juan Sevilla  Attending: Juan Sevilla  Case Management: Emily Cisneros  Consultant: Arianna Mejia  Covering Team: Les Choi  Nurse: Clayton Du  Nurse: Michelle Pyle  Nurse: Rica Moore  Override: Fallon De La Torre  Override: Hannah Read  Override: Armond Wiley  Override: Emmett Caballero  PCA/Nursing Assistant: Hannah Read  PCA/Nursing Assistant: Janice Dean  Primary Team: Marta Traylor  Primary Team: Isabella Joseph  Registered Dietitian: Camille Woodard  : Alma Richmond

## 2023-10-30 NOTE — DISCHARGE NOTE PROVIDER - NPI NUMBER (FOR SYSADMIN USE ONLY) :
[[id number]] [[id number]],[[id number]],[[id number]] [[id number]],[[id number]],[[id number]],[UNKNOWN]

## 2023-10-30 NOTE — PROGRESS NOTE ADULT - SUBJECTIVE AND OBJECTIVE BOX
Date of Service: 10-30-23 @ 12:37    Patient is a 28y old  Female who presents with a chief complaint of fever (29 Oct 2023 17:13)      INTERVAL HPI/OVERNIGHT EVENTS: Patient seen and examined. NAD. No complaints.    Vital Signs Last 24 Hrs  T(C): 37.2 (30 Oct 2023 12:11), Max: 39.5 (30 Oct 2023 07:20)  T(F): 98.9 (30 Oct 2023 12:11), Max: 103.1 (30 Oct 2023 07:20)  HR: 77 (30 Oct 2023 12:11) (72 - 99)  BP: 104/69 (30 Oct 2023 12:11) (96/61 - 128/77)  BP(mean): --  RR: 19 (30 Oct 2023 12:11) (16 - 19)  SpO2: 98% (30 Oct 2023 12:11) (95% - 99%)    Parameters below as of 30 Oct 2023 12:11  Patient On (Oxygen Delivery Method): room air        10-30    138  |  109<H>  |  5<L>  ----------------------------<  108<H>  3.6   |  22  |  0.54    Ca    8.1<L>      30 Oct 2023 07:15  Mg     2.1     10-30    TPro  8.8<H>  /  Alb  3.6  /  TBili  1.0  /  DBili  x   /  AST  18  /  ALT  31  /  AlkPhos  62  10-29                          10.6   7.20  )-----------( 231      ( 30 Oct 2023 07:15 )             32.9     PT/INR - ( 29 Oct 2023 06:25 )   PT: 14.9 sec;   INR: 1.34 ratio         PTT - ( 29 Oct 2023 06:25 )  PTT:41.9 sec  CAPILLARY BLOOD GLUCOSE        Urinalysis Basic - ( 30 Oct 2023 07:15 )    Color: x / Appearance: x / SG: x / pH: x  Gluc: 108 mg/dL / Ketone: x  / Bili: x / Urobili: x   Blood: x / Protein: x / Nitrite: x   Leuk Esterase: x / RBC: x / WBC x   Sq Epi: x / Non Sq Epi: x / Bacteria: x    Culture - Blood (10.29.23 @ 06:25)   Specimen Source: .Blood Blood-Peripheral  Culture Results:   No growth at 24 hours      Historical Values  Culture - Blood (10.29.23 @ 06:25)   Specimen Source: .Blood Blood-Peripheral  Culture Results:   No growth at 24 hours          acetaminophen     Tablet .. 650 milliGRAM(s) Oral every 6 hours PRN  aluminum hydroxide/magnesium hydroxide/simethicone Suspension 30 milliLiter(s) Oral every 4 hours PRN  ciprofloxacin   IVPB 400 milliGRAM(s) IV Intermittent every 12 hours  melatonin 3 milliGRAM(s) Oral at bedtime PRN  ondansetron Injectable 4 milliGRAM(s) IV Push every 6 hours PRN  sodium chloride 0.9%. 1000 milliLiter(s) IV Continuous <Continuous>              REVIEW OF SYSTEMS:  CONSTITUTIONAL: No fever, no weight loss, or no fatigue  NECK: No pain, no stiffness  RESPIRATORY: No cough, no wheezing, no chills, no hemoptysis, No shortness of breath  CARDIOVASCULAR: No chest pain, no palpitations, no dizziness, no leg swelling  GASTROINTESTINAL: No abdominal pain. No nausea, no vomiting, no hematemesis; No diarrhea, no constipation. No melena, no hematochezia.  GENITOURINARY: No dysuria, no frequency, no hematuria, no incontinence  NEUROLOGICAL: No headaches, no loss of strength, no numbness, no tremors  SKIN: No itching, no burning  MUSCULOSKELETAL: No joint pain, no swelling; No muscle, no back, no extremity pain  PSYCHIATRIC: No depression, no mood swings,   HEME/LYMPH: No easy bruising, no bleeding gums  ALLERY AND IMMUNOLOGIC: No hives       Consultant(s) Notes Reviewed:  [X] YES  [ ] NO    PHYSICAL EXAM:  GENERAL: NAD  HEAD:  Atraumatic, Normocephalic  EYES: EOMI, PERRLA, conjunctiva and sclera clear  ENMT: No tonsillar erythema, exudates, or enlargement; Moist mucous membranes  NECK: Supple, No JVD  NERVOUS SYSTEM:  Awake & alert  CHEST/LUNG: Clear to auscultation bilaterally; No rales, rhonchi, wheezing,  HEART: Regular rate and rhythm  ABDOMEN: Soft, Nontender, Nondistended; Bowel sounds present  EXTREMITIES:  No clubbing, cyanosis, or edema  LYMPH: No lymphadenopathy noted  SKIN: No rashes      Advanced care planning discussed with patient/family [X] YES   [ ] NO    Advanced care planning discussed with patient/family. Patient's health status was discussed. All appropriate changes have been made regarding patient's end-of-life care. Advanced care planning forms reviewed/discussed/completed.  20 minutes spent.

## 2023-10-30 NOTE — DISCHARGE NOTE PROVIDER - PROVIDER TOKENS
PROVIDER:[TOKEN:[[id number]]] PROVIDER:[TOKEN:[[id number]]],PROVIDER:[TOKEN:[[id number]],FOLLOWUP:[1 week]],PROVIDER:[TOKEN:[[id number]],FOLLOWUP:[1 week]] PROVIDER:[TOKEN:[[id number]]],PROVIDER:[TOKEN:[[id number]],FOLLOWUP:[1 week]],PROVIDER:[TOKEN:[[id number]],FOLLOWUP:[1 week]],FREE:[LAST:[GYN],PHONE:[(   )    -],FAX:[(   )    -],FOLLOWUP:[1 month]]

## 2023-10-30 NOTE — PATIENT CHOICE NOTE. - NSPTCHOICESTATE_GEN_ALL_CORE

## 2023-10-30 NOTE — DISCHARGE NOTE PROVIDER - HOSPITAL COURSE
HPI:  This is a 28 F with no PMH who comes in c/o fevers 102-103 mostly at night for the past four days associated with n/v, chills, back pain, headache with cough, runny nose. Patient denies dysuria, urinary hesitancy, increased frequency or flank pain. At home, everyone has a URI. (29 Oct 2023 17:13)      ---  HOSPITAL COURSE: Patient admitted to medicine floor for management of pyelonephritis. Patient also c/o HA, neuro following, MRI ordered. Patient was seen by ID, started on po antibiotics 2/2 allergy to pcn and cefazolin.     Pt seen and examined on day of discharge. Patient is medically optimized for discharge to home with close outpatient followup.    PHYSICAL EXAM ON DAY OF DISCHARGE:        ---  CONSULTANTS:     ---  TIME SPENT:  I, the attending physician, was physically present for the key portions of the evaluation and management (E/M) service provided. The total amount of time spent reviewing the hospital notes, laboratory values, imaging findings, assessing/counseling the patient, discussing with consultant physicians, social work, nursing staff was -- minutes    ---  Primary care provider was made aware of plan for discharge:      [  ] NO     [  ] YES   HPI:  This is a 28 F with no PMH who comes in c/o fevers 102-103 mostly at night for the past four days associated with n/v, chills, back pain, headache with cough, runny nose. Patient denies dysuria, urinary hesitancy, increased frequency or flank pain. At home, everyone has a URI. (29 Oct 2023 17:13)      ---  HOSPITAL COURSE: Patient admitted to medicine floor for management of pyelonephritis. Patient also c/o HA, neuro following, MRI ordered. Patient was seen by ID, started on po antibiotics 2/2 allergy to pcn and cefazolin.   Blood cultures NTD  Urine culture ESBL  Going home on iv ertapenem total 10 days    Pt seen and examined on day of discharge. Patient is medically optimized for discharge to home with close outpatient followup.    PHYSICAL EXAM ON DAY OF DISCHARGE:        ---  CONSULTANTS:     ---  TIME SPENT:  I, the attending physician, was physically present for the key portions of the evaluation and management (E/M) service provided. The total amount of time spent reviewing the hospital notes, laboratory values, imaging findings, assessing/counseling the patient, discussing with consultant physicians, social work, nursing staff was -- minutes    ---  Primary care provider was made aware of plan for discharge:      [  ] NO     [  ] YES   HPI:  This is a 28 F with no PMH who comes in c/o fevers 102-103 mostly at night for the past four days associated with n/v, chills, back pain, headache with cough, runny nose. Patient denies dysuria, urinary hesitancy, increased frequency or flank pain. At home, everyone has a URI. (29 Oct 2023 17:13)      ---  HOSPITAL COURSE: Patient admitted to medicine floor for management of pyelonephritis. Patient also c/o HA, neuro following, MRI ordered. Patient was seen by ID, started on po antibiotics 2/2 allergy to pcn and cefazolin.   Blood cultures NTD  Urine culture ESBL -- changed to ertapenem  Going home on iv ertapenem total 10 days which she'll get at the  infusion [place].  Had migraine h/a which responded to sumatriptan  +Ovarian cyst -- needs outpatient f/u  Pt seen and examined on day of discharge. Patient is medically optimized for discharge to home with close outpatient followup.      ---  TIME SPENT:  I, the attending physician, was physically present for the key portions of the evaluation and management (E/M) service provided. The total amount of time spent reviewing the hospital notes, laboratory values, imaging findings, assessing/counseling the patient, discussing with consultant physicians, social work, nursing staff was -- minutes    >35 minutes spent on discharge

## 2023-10-30 NOTE — DISCHARGE NOTE PROVIDER - NSDCCPCAREPLAN_GEN_ALL_CORE_FT
PRINCIPAL DISCHARGE DIAGNOSIS  Diagnosis: Acute pyelonephritis  Assessment and Plan of Treatment: Youwere admitted with a urinary tract infection. You were seen by the infectious disease docto who recommended you start onoral antibiotics. Your symptoms have resolved. Please follow with your primary medical doctor.  INCOMPLETE      SECONDARY DISCHARGE DIAGNOSES  Diagnosis: Enterovirus infection  Assessment and Plan of Treatment: Continue supportive care.    Diagnosis: Headache  Assessment and Plan of Treatment: You complained of headache. You were given medicaiton to relive your symptoms. You were seen by the neurologist....INCOMPLETE     PRINCIPAL DISCHARGE DIAGNOSIS  Diagnosis: Acute pyelonephritis  Assessment and Plan of Treatment: Youwere admitted with a urinary tract infection. You were seen by the infectious disease docto who recommended you start onoral antibiotics. Your symptoms have resolved. Please follow with your primary medical doctor.  Follow-up with [facility] center tomorrow for iv antibiotics.      SECONDARY DISCHARGE DIAGNOSES  Diagnosis: Enterovirus infection  Assessment and Plan of Treatment: Continue supportive care.    Diagnosis: Ovarian cyst  Assessment and Plan of Treatment:     Diagnosis: Headache  Assessment and Plan of Treatment: You complained of headache. You were given medicaiton to relive your symptoms. Follow-up with neurology as outpatient.     PRINCIPAL DISCHARGE DIAGNOSIS  Diagnosis: Acute pyelonephritis  Assessment and Plan of Treatment: Youwere admitted with a urinary tract infection. You were seen by the infectious disease docto who recommended you start onoral antibiotics. Your symptoms have resolved. Please follow with your primary medical doctor.  Follow-up with [facility] center tomorrow for iv antibiotics.      SECONDARY DISCHARGE DIAGNOSES  Diagnosis: Enterovirus infection  Assessment and Plan of Treatment: Continue supportive care.    Diagnosis: Ovarian cyst  Assessment and Plan of Treatment: You'll need to follow-up with your gyn this month. You'll need to have a repeat MRI in 6 weeks    Diagnosis: Headache  Assessment and Plan of Treatment: You complained of headache. You were given medicaiton to relive your symptoms. Follow-up with neurology as outpatient. Take sumatriptan once a day if needed for a migraine headache

## 2023-10-30 NOTE — DISCHARGE NOTE PROVIDER - CARE PROVIDER_API CALL
MANOJ PEREZ  [address] [place] [address]  Phone: [phone]  Fax: [phone]  Follow Up Time:    MANOJ PEREZ  [address] [place] [address]  Phone: [phone]  Fax: [phone]  Follow Up Time:     Tamiko Petersen  Neurology  [address] [place] [address]  Phone: [phone]  Fax: [phone]  Follow Up Time: 1 week    Anila Joseph  Infectious Disease  [address] [place] [address], [facility] [address]  [place] [address]  Phone: [phone]  Fax: [phone]  Follow Up Time: 1 week   MANOJ PEREZ  [address] [place] [address]  Phone: [phone]  Fax: [phone]  Follow Up Time:     Tamiko Petersen  Neurology  [address] [place] [address]  Phone: [phone]  Fax: [phone]  Follow Up Time: 1 week    Anila Joseph  Infectious Disease  [address] [place] [address], [facility] [address]  [place] [address]  Phone: [phone]  Fax: [phone]  Follow Up Time: 1 week    GYN,   Phone: (   )    -  Fax: (   )    -  Follow Up Time: 1 month

## 2023-10-30 NOTE — CARE COORDINATION ASSESSMENT. - OTHER PERTINENT DISCHARGE PLANNING INFORMATION:
Met with patient and mother a bedside. Patient consents to conversation with mother present. Role of CM/transition planning explained. Patient verbalizes understanding. Transition information provided at this time. Patient lives in private house with her parents, 5 [address], 5 inside to bedroom. Patient is independent with all ADL's and ambulation, drives, student and works part-time. No needs/HCS present PTA. No new needs anticipated. Patient's mom Leny will transport home on DC. inue to follow throughout hospital stay.

## 2023-10-30 NOTE — CHART NOTE - NSCHARTNOTEFT_GEN_A_CORE
Called by RN for pt complaining of migraine/headache.   Pt with chronic hx of headache. Recurring complaint while inpatient.  Ibuprofen given for headache and fever.   Day team to place standing or prn order for headache management. Called by RN for pt complaining of migraine/headache.   Pt with chronic hx of headache. Recurring complaint while inpatient.  Pt states that headaches are due to a cyst in her brain. MRI performed and shows Unchanged appearing right middle cranial fossa arachnoid cyst.  Ibuprofen given for headache and fever.   Day team to place standing or prn order for headache management. Called by RN for pt complaining of migraine/headache.   Pt with chronic hx of headache. Recurring complaint while inpatient.  Pt states that headaches are due to a cyst in her brain. MRI performed and shows Unchanged appearing right middle cranial fossa arachnoid cyst.  Ibuprofen given for headache and fever. Pt elicits no relief from tylenol.  Day team to place standing or prn order for headache management.

## 2023-10-30 NOTE — PROGRESS NOTE ADULT - SUBJECTIVE AND OBJECTIVE BOX
CARMEN ROJAS is a 28yFemale , patient examined and chart reviewed    INTERVAL HPI/ OVERNIGHT EVENTS:   c/o HAs. Afebrile    PAST MEDICAL & SURGICAL HISTORY:  No pertinent past medical history  No significant past surgical history        For details regarding the patient's social history, family history, and other miscellaneous elements, please refer the initial infectious diseases consultation and/or the admitting history and physical examination for this admission.    ROS:  CONSTITUTIONAL:  Negative fever or chills  EYES:  Negative  blurry vision or double vision  CARDIOVASCULAR:  Negative for chest pain or palpitations  RESPIRATORY:  Negative for cough, wheezing, or SOB   GASTROINTESTINAL:  Negative for nausea, vomiting, diarrhea, constipation, or abdominal pain  GENITOURINARY:  Negative frequency, urgency or dysuria  NEUROLOGIC:  No headache, confusion, dizziness, lightheadedness  All other systems were reviewed and are negative     ALLERGIES  cefazolin (Hives; Rash)  latex (Rash)  Benadryl (Other)  Reglan (Dystonic RXN)  penicillin (Rash)  Demerol (Other)      Current inpatient medications :    ANTIBIOTICS/RELEVANT:  ciprofloxacin   IVPB 400 milliGRAM(s) IV Intermittent every 12 hours      acetaminophen     Tablet .. 650 milliGRAM(s) Oral every 6 hours PRN  aluminum hydroxide/magnesium hydroxide/simethicone Suspension 30 milliLiter(s) Oral every 4 hours PRN  melatonin 3 milliGRAM(s) Oral at bedtime PRN  ondansetron Injectable 4 milliGRAM(s) IV Push every 6 hours PRN  sodium chloride 0.9%. 1000 milliLiter(s) IV Continuous <Continuous>  SUMAtriptan Injectable 6 milliGRAM(s) SubCutaneous once      Objective:    T(C): 37.2 (10-30-23 @ 12:11), Max: 39.5 (10-30-23 @ 07:20)  HR: 77 (10-30-23 @ 12:11) (72 - 99)  BP: 104/69 (10-30-23 @ 12:11) (96/61 - 126/78)  RR: 19 (10-30-23 @ 12:11) (16 - 19)  SpO2: 98% (10-30-23 @ 12:11) (95% - 99%)      Physical Exam:  General: no acute distress  Neck: supple, trachea midline  Lungs: clear, no wheeze/rhonchi  Cardiovascular: regular rate and rhythm, S1 S2  Abdomen: soft, nontender,  bowel sounds normal  Neurological: alert and oriented x3  Skin: no rash  Extremities: no edema      LABS:                        10.6   7.20  )-----------( 231      ( 30 Oct 2023 07:15 )             32.9       10-30    138  |  109<H>  |  5<L>  ----------------------------<  108<H>  3.6   |  22  |  0.54    Ca    8.1<L>      30 Oct 2023 07:15  Mg     2.1     10-30    TPro  8.8<H>  /  Alb  3.6  /  TBili  1.0  /  DBili  x   /  AST  18  /  ALT  31  /  AlkPhos  62  10-29      PT/INR - ( 29 Oct 2023 06:25 )   PT: 14.9 sec;   INR: 1.34 ratio         PTT - ( 29 Oct 2023 06:25 )  PTT:41.9 sec    Urine Microscopic-Add On (NC) (10.29.23 @ 06:25)    Red Blood Cell - Urine: 10 /HPF   White Blood Cell - Urine: >50 /HPF   Hyaline Casts: 0-2 /HPF   Bacteria: Too Numerous to count /HPF      MICROBIOLOGY:  Culture - Blood (collected 29 Oct 2023 06:25)  Source: .Blood Blood-Peripheral  Preliminary Report (30 Oct 2023 12:01):    No growth at 24 hours    Culture - Blood (collected 29 Oct 2023 06:25)  Source: .Blood Blood-Peripheral  Preliminary Report (30 Oct 2023 12:01):    No growth at 24 hours    Culture - Urine (collected 29 Oct 2023 06:25)  Source: Clean Catch Clean Catch (Midstream)  Preliminary Report (30 Oct 2023 13:47):    >100,000 CFU/ml Escherichia coli      RADIOLOGY & ADDITIONAL STUDIES:    ACC: [id number] EXAM:  CT ABDOMEN AND PELVIS IC   ORDERED BY: PHILIP MELENDEZ     PROCEDURE DATE:  10/29/2023          INTERPRETATION:  CLINICAL INFORMATION: Fever, chills, body aches;   evaluate urinary system    COMPARISON: None.    CONTRAST/COMPLICATIONS:  IV Contrast: Omnipaque 350  90 cc administered   10 cc discarded  Oral Contrast: None  Complications: None    PROCEDURE:  CT of the Abdomen and Pelvis was performed.  Sagittal and coronal reformats were performed.    FINDINGS:  LOWER CHEST: No significant abnormality.    LIVER: Borderline in size. Mild diffuse fatty infiltration.  BILE DUCTS: Normal in caliber.  GALLBLADDER: No intraluminal calcification or pericholecystic fluid  SPLEEN: Normal in size and configuration.  PANCREAS:Unremarkable.  ADRENALS: No mass.  KIDNEYS/URETERS: Subtle area of focal right upper pole hypoenhancement   (5-80 and 4-60). Mild right perinephric and periureteral stranding.   Slight prominence right renal pelvis and proximal-mid ureter. No renal or   ureteral calcifications.    BLADDER: Grossly unremarkable.  REPRODUCTIVE ORGANS: Midline uterus. 9 x 6.5 x 7 cm cystic lesion   anterior to uterus and superior to urinary bladder.    BOWEL: No acute pathology or bowel obstruction. Appendix normal in   caliber.  PERITONEUM: No ascites or free air.  VESSELS: Normal in caliber.  RETROPERITONEUM/LYMPH NODES: No lymphadenopathy.  ABDOMINAL WALL: No abnormal mass or fluid collection.  BONES: No acute fracture or focal destructive or blastic lesion.    IMPRESSION:    Findings compatible with right-sided pyelonephritis.    9 x 6.5 x 7 cm cystic lesion anterior to uterus and superior to urinary   bladder, presumably ovarian. Follow-up pelvic ultrasound is recommended   for further evaluation in this regard.      Assessment :  28f with obesity presents with c/o fever, chills, back pain, headache  photophobia and vomiting  ct with right perinephric stranding  ua pyuria right pyelonephritis ux with >100,000 CFU/ml Escherichia coli  enterovirus - supportive care    Plan  pt with pcn and cefazolin allergy so   cont cipro 400 q 12  follow urine cx   follow blood cx      Continue with present regiment.  Appropriate use of antibiotics and adverse effects reviewed.      I have discussed the above plan of care with patient and mother in detail. They expressed understanding of the  treatment plan . Risks, benefits and alternatives discussed in detail. I have asked if they have any questions or concerns and appropriately addressed them to the best of my ability .    > 35 minutes were spent in direct patient care reviewing notes, medications ,labs data/ imaging , discussion with multidisciplinary team.    Thank you for allowing me to participate in care of your patient .    Arianna Mejia MD  Infectious Disease  [phone]

## 2023-10-30 NOTE — DISCHARGE NOTE PROVIDER - NSDCMRMEDTOKEN_GEN_ALL_CORE_FT
clobetasol 0.05% topical ointment: Apply topically to affected area 2 times a day as needed for  rash 1 Apply topically to affected area 2 times a day  ertapenem 1 g injection: 1 gram(s) injectable once a day at [facility]#[id number];[facility]  SUMAtriptan 100 mg oral tablet: 1 tab(s) orally every 24 hours as needed for Headache

## 2023-10-31 LAB
ANION GAP SERPL CALC-SCNC: 8 MMOL/L — SIGNIFICANT CHANGE UP (ref 5–17)
ANION GAP SERPL CALC-SCNC: 8 MMOL/L — SIGNIFICANT CHANGE UP (ref 5–17)
BASOPHILS # BLD AUTO: 0.03 K/UL — SIGNIFICANT CHANGE UP (ref 0–0.2)
BASOPHILS # BLD AUTO: 0.03 K/UL — SIGNIFICANT CHANGE UP (ref 0–0.2)
BASOPHILS NFR BLD AUTO: 0.5 % — SIGNIFICANT CHANGE UP (ref 0–2)
BASOPHILS NFR BLD AUTO: 0.5 % — SIGNIFICANT CHANGE UP (ref 0–2)
BUN SERPL-MCNC: 4 MG/DL — LOW (ref 7–23)
BUN SERPL-MCNC: 4 MG/DL — LOW (ref 7–23)
CALCIUM SERPL-MCNC: 8 MG/DL — LOW (ref 8.5–10.1)
CALCIUM SERPL-MCNC: 8 MG/DL — LOW (ref 8.5–10.1)
CHLORIDE SERPL-SCNC: 109 MMOL/L — HIGH (ref 96–108)
CHLORIDE SERPL-SCNC: 109 MMOL/L — HIGH (ref 96–108)
CO2 SERPL-SCNC: 23 MMOL/L — SIGNIFICANT CHANGE UP (ref 22–31)
CO2 SERPL-SCNC: 23 MMOL/L — SIGNIFICANT CHANGE UP (ref 22–31)
CREAT SERPL-MCNC: 0.44 MG/DL — LOW (ref 0.5–1.3)
CREAT SERPL-MCNC: 0.44 MG/DL — LOW (ref 0.5–1.3)
CRP SERPL-MCNC: 158 MG/L — HIGH
CRP SERPL-MCNC: 158 MG/L — HIGH
EGFR: 136 ML/MIN/1.73M2 — SIGNIFICANT CHANGE UP
EGFR: 136 ML/MIN/1.73M2 — SIGNIFICANT CHANGE UP
EOSINOPHIL # BLD AUTO: 0.01 K/UL — SIGNIFICANT CHANGE UP (ref 0–0.5)
EOSINOPHIL # BLD AUTO: 0.01 K/UL — SIGNIFICANT CHANGE UP (ref 0–0.5)
EOSINOPHIL NFR BLD AUTO: 0.2 % — SIGNIFICANT CHANGE UP (ref 0–6)
EOSINOPHIL NFR BLD AUTO: 0.2 % — SIGNIFICANT CHANGE UP (ref 0–6)
ERYTHROCYTE [SEDIMENTATION RATE] IN BLOOD: 82 MM/HR — HIGH (ref 0–15)
ERYTHROCYTE [SEDIMENTATION RATE] IN BLOOD: 82 MM/HR — HIGH (ref 0–15)
GLUCOSE SERPL-MCNC: 87 MG/DL — SIGNIFICANT CHANGE UP (ref 70–99)
GLUCOSE SERPL-MCNC: 87 MG/DL — SIGNIFICANT CHANGE UP (ref 70–99)
HCT VFR BLD CALC: 33.4 % — LOW (ref 34.5–45)
HCT VFR BLD CALC: 33.4 % — LOW (ref 34.5–45)
HGB BLD-MCNC: 10.9 G/DL — LOW (ref 11.5–15.5)
HGB BLD-MCNC: 10.9 G/DL — LOW (ref 11.5–15.5)
IMM GRANULOCYTES NFR BLD AUTO: 0.5 % — SIGNIFICANT CHANGE UP (ref 0–0.9)
IMM GRANULOCYTES NFR BLD AUTO: 0.5 % — SIGNIFICANT CHANGE UP (ref 0–0.9)
LYMPHOCYTES # BLD AUTO: 1.26 K/UL — SIGNIFICANT CHANGE UP (ref 1–3.3)
LYMPHOCYTES # BLD AUTO: 1.26 K/UL — SIGNIFICANT CHANGE UP (ref 1–3.3)
LYMPHOCYTES # BLD AUTO: 23 % — SIGNIFICANT CHANGE UP (ref 13–44)
LYMPHOCYTES # BLD AUTO: 23 % — SIGNIFICANT CHANGE UP (ref 13–44)
MAGNESIUM SERPL-MCNC: 2.2 MG/DL — SIGNIFICANT CHANGE UP (ref 1.6–2.6)
MAGNESIUM SERPL-MCNC: 2.2 MG/DL — SIGNIFICANT CHANGE UP (ref 1.6–2.6)
MCHC RBC-ENTMCNC: 27.8 PG — SIGNIFICANT CHANGE UP (ref 27–34)
MCHC RBC-ENTMCNC: 27.8 PG — SIGNIFICANT CHANGE UP (ref 27–34)
MCHC RBC-ENTMCNC: 32.6 GM/DL — SIGNIFICANT CHANGE UP (ref 32–36)
MCHC RBC-ENTMCNC: 32.6 GM/DL — SIGNIFICANT CHANGE UP (ref 32–36)
MCV RBC AUTO: 85.2 FL — SIGNIFICANT CHANGE UP (ref 80–100)
MCV RBC AUTO: 85.2 FL — SIGNIFICANT CHANGE UP (ref 80–100)
MONOCYTES # BLD AUTO: 0.65 K/UL — SIGNIFICANT CHANGE UP (ref 0–0.9)
MONOCYTES # BLD AUTO: 0.65 K/UL — SIGNIFICANT CHANGE UP (ref 0–0.9)
MONOCYTES NFR BLD AUTO: 11.9 % — SIGNIFICANT CHANGE UP (ref 2–14)
MONOCYTES NFR BLD AUTO: 11.9 % — SIGNIFICANT CHANGE UP (ref 2–14)
NEUTROPHILS # BLD AUTO: 3.5 K/UL — SIGNIFICANT CHANGE UP (ref 1.8–7.4)
NEUTROPHILS # BLD AUTO: 3.5 K/UL — SIGNIFICANT CHANGE UP (ref 1.8–7.4)
NEUTROPHILS NFR BLD AUTO: 63.9 % — SIGNIFICANT CHANGE UP (ref 43–77)
NEUTROPHILS NFR BLD AUTO: 63.9 % — SIGNIFICANT CHANGE UP (ref 43–77)
NRBC # BLD: 0 /100 WBCS — SIGNIFICANT CHANGE UP (ref 0–0)
NRBC # BLD: 0 /100 WBCS — SIGNIFICANT CHANGE UP (ref 0–0)
PLATELET # BLD AUTO: 233 K/UL — SIGNIFICANT CHANGE UP (ref 150–400)
PLATELET # BLD AUTO: 233 K/UL — SIGNIFICANT CHANGE UP (ref 150–400)
POTASSIUM SERPL-MCNC: 3.4 MMOL/L — LOW (ref 3.5–5.3)
POTASSIUM SERPL-MCNC: 3.4 MMOL/L — LOW (ref 3.5–5.3)
POTASSIUM SERPL-SCNC: 3.4 MMOL/L — LOW (ref 3.5–5.3)
POTASSIUM SERPL-SCNC: 3.4 MMOL/L — LOW (ref 3.5–5.3)
RBC # BLD: 3.92 M/UL — SIGNIFICANT CHANGE UP (ref 3.8–5.2)
RBC # BLD: 3.92 M/UL — SIGNIFICANT CHANGE UP (ref 3.8–5.2)
RBC # FLD: 13.9 % — SIGNIFICANT CHANGE UP (ref 10.3–14.5)
RBC # FLD: 13.9 % — SIGNIFICANT CHANGE UP (ref 10.3–14.5)
SODIUM SERPL-SCNC: 140 MMOL/L — SIGNIFICANT CHANGE UP (ref 135–145)
SODIUM SERPL-SCNC: 140 MMOL/L — SIGNIFICANT CHANGE UP (ref 135–145)
WBC # BLD: 5.48 K/UL — SIGNIFICANT CHANGE UP (ref 3.8–10.5)
WBC # BLD: 5.48 K/UL — SIGNIFICANT CHANGE UP (ref 3.8–10.5)
WBC # FLD AUTO: 5.48 K/UL — SIGNIFICANT CHANGE UP (ref 3.8–10.5)
WBC # FLD AUTO: 5.48 K/UL — SIGNIFICANT CHANGE UP (ref 3.8–10.5)

## 2023-10-31 PROCEDURE — 76856 US EXAM PELVIC COMPLETE: CPT | Mod: 26

## 2023-10-31 RX ORDER — ERTAPENEM SODIUM 1 G/1
1000 INJECTION, POWDER, LYOPHILIZED, FOR SOLUTION INTRAMUSCULAR; INTRAVENOUS EVERY 24 HOURS
Refills: 0 | Status: DISCONTINUED | OUTPATIENT
Start: 2023-11-01 | End: 2023-11-02

## 2023-10-31 RX ORDER — ERTAPENEM SODIUM 1 G/1
INJECTION, POWDER, LYOPHILIZED, FOR SOLUTION INTRAMUSCULAR; INTRAVENOUS
Refills: 0 | Status: DISCONTINUED | OUTPATIENT
Start: 2023-10-31 | End: 2023-11-02

## 2023-10-31 RX ORDER — POTASSIUM CHLORIDE 20 MEQ
40 PACKET (EA) ORAL ONCE
Refills: 0 | Status: COMPLETED | OUTPATIENT
Start: 2023-10-31 | End: 2023-10-31

## 2023-10-31 RX ORDER — ERTAPENEM SODIUM 1 G/1
1000 INJECTION, POWDER, LYOPHILIZED, FOR SOLUTION INTRAMUSCULAR; INTRAVENOUS ONCE
Refills: 0 | Status: COMPLETED | OUTPATIENT
Start: 2023-10-31 | End: 2023-10-31

## 2023-10-31 RX ORDER — SUMATRIPTAN SUCCINATE 4 MG/.5ML
6 INJECTION, SOLUTION SUBCUTANEOUS ONCE
Refills: 0 | Status: COMPLETED | OUTPATIENT
Start: 2023-10-31 | End: 2023-10-31

## 2023-10-31 RX ADMIN — Medication 200 MILLIGRAM(S): at 05:03

## 2023-10-31 RX ADMIN — SUMATRIPTAN SUCCINATE 6 MILLIGRAM(S): 4 INJECTION, SOLUTION SUBCUTANEOUS at 16:33

## 2023-10-31 RX ADMIN — SODIUM CHLORIDE 100 MILLILITER(S): 9 INJECTION INTRAMUSCULAR; INTRAVENOUS; SUBCUTANEOUS at 05:03

## 2023-10-31 RX ADMIN — SUMATRIPTAN SUCCINATE 6 MILLIGRAM(S): 4 INJECTION, SOLUTION SUBCUTANEOUS at 16:14

## 2023-10-31 RX ADMIN — Medication 40 MILLIEQUIVALENT(S): at 12:52

## 2023-10-31 RX ADMIN — ERTAPENEM SODIUM 120 MILLIGRAM(S): 1 INJECTION, POWDER, LYOPHILIZED, FOR SOLUTION INTRAMUSCULAR; INTRAVENOUS at 12:52

## 2023-10-31 NOTE — PROGRESS NOTE ADULT - SUBJECTIVE AND OBJECTIVE BOX
Neurology Follow up note    CARMEN [id number]    HPI:  This is a 28 F with no PMH who comes in c/o fevers 102-103 mostly at night for the past four days associated with n/v, chills, back pain, headache with cough, runny nose. Patient denies dysuria, urinary hesitancy, increased frequency or flank pain. At home, everyone has a URI. (29 Oct 2023 17:13)      Interval History -ha today after fever    Patient is seen, chart was reviewed and case was discussed with the treatment team.  Pt is not in any distress.   Lying on bed comfortably.     Vital Signs Last 24 Hrs  T(C): 37.1 (31 Oct 2023 11:33), Max: 38.9 (30 Oct 2023 20:55)  T(F): 98.7 (31 Oct 2023 11:33), Max: 102.1 (30 Oct 2023 20:55)  HR: 84 (31 Oct 2023 11:33) (60 - 94)  BP: 125/76 (31 Oct 2023 11:33) (99/67 - 125/76)  BP(mean): --  RR: 18 (31 Oct 2023 11:33) (18 - 19)  SpO2: 98% (31 Oct 2023 11:33) (95% - 98%)    Parameters below as of 31 Oct 2023 11:33  Patient On (Oxygen Delivery Method): room air        Height (cm): 170.2 (10-31 @ 12:48)  Weight (kg): 107.501 (10-31 @ 12:48)  BMI (kg/m2): 37.1 (10-31 @ 12:48)    REVIEW OF SYSTEMS:    Constitutional: No f weight loss or fatigue  Eyes: No eye pain, visual disturbances, or discharge  ENT:  No difficulty hearing, tinnitus, vertigo; No sinus or throat pain  Neck: No pain or stiffness  Respiratory: No cough, wheezing, chills or hemoptysis  Cardiovascular: No chest pain, palpitations, shortness of breath, dizziness or leg swelling  Gastrointestinal: No  No nausea, vomiting or hematemesis;   Genitourinary: No dysuria,  hematuria or incontinence  Neurological: No headaches, , numbness or tremors  Psychiatric: No depression, anxiety, mood swings or difficulty sleeping  Musculoskeletal: No joint pain or swelling;   Skin: No itching, burning, rashes or lesions   Lymph Nodes: No enlarged glands  Endocrine: No heat or cold intolerance; No hair loss,   Allergy and Immunologic: No hives or eczema    On Neurological Examination:    Mental Status - Pt is alert, awake, oriented X3. . Follows commands well and able to answer questions appropriately.Mood and affect  normal    Speech -  Normal.    Cranial Nerves - Pupils 3 mm equal and reactive to light, extraocular eye movements intact. Pt has no visual field deficit.  Pt has no  facial asymmetry. Facial sensation is intact.Tongue - is in midline.    Muscle tone - is normal    Motor Exam - 5/5 all over, No drift. No shaking or tremors.    Sensory Exam - Pin prick, temperature, joint position and vibration are intact on either side. Pt withdraws all extremities equally on stimulation. No asymmetry seen. No complaints of tingling, numbness.    coordination:    Finger to nose: normal      Deep tendon Reflexes - 2 plus all over.        Neck Supple -  Yes.     MEDICATIONS    acetaminophen     Tablet .. 650 milliGRAM(s) Oral every 6 hours PRN  aluminum hydroxide/magnesium hydroxide/simethicone Suspension 30 milliLiter(s) Oral every 4 hours PRN  enoxaparin Injectable 40 milliGRAM(s) SubCutaneous every 24 hours  ertapenem  IVPB      melatonin 3 milliGRAM(s) Oral at bedtime PRN  ondansetron Injectable 4 milliGRAM(s) IV Push every 6 hours PRN  sodium chloride 0.9%. 1000 milliLiter(s) IV Continuous <Continuous>      Allergies    cefazolin (Hives; Rash)  latex (Rash)  Benadryl (Other)  Reglan (Dystonic RXN)  penicillin (Rash)  Demerol (Other)    Intolerances        LABS:  CBC Full  -  ( 31 Oct 2023 07:20 )  WBC Count : 5.48 K/uL  RBC Count : 3.92 M/uL  Hemoglobin : 10.9 g/dL  Hematocrit : 33.4 %  Platelet Count - Automated : 233 K/uL  Mean Cell Volume : 85.2 fl  Mean Cell Hemoglobin : 27.8 pg  Mean Cell Hemoglobin Concentration : 32.6 gm/dL  Auto Neutrophil # : 3.50 K/uL  Auto Lymphocyte # : 1.26 K/uL  Auto Monocyte # : 0.65 K/uL  Auto Eosinophil # : 0.01 K/uL  Auto Basophil # : 0.03 K/uL  Auto Neutrophil % : 63.9 %  Auto Lymphocyte % : 23.0 %  Auto Monocyte % : 11.9 %  Auto Eosinophil % : 0.2 %  Auto Basophil % : 0.5 %    Urinalysis Basic - ( 31 Oct 2023 07:20 )    Color: x / Appearance: x / SG: x / pH: x  Gluc: 87 mg/dL / Ketone: x  / Bili: x / Urobili: x   Blood: x / Protein: x / Nitrite: x   Leuk Esterase: x / RBC: x / WBC x   Sq Epi: x / Non Sq Epi: x / Bacteria: x      10-31    140  |  109<H>  |  4<L>  ----------------------------<  87  3.4<L>   |  23  |  0.44<L>    Ca    8.0<L>      31 Oct 2023 07:20  Mg     2.2     10-31      Hemoglobin A1C:     Vitamin B12     RADIOLOGY    ASSESSMENT AND PLAN:      seen for ha  likely migraine  arachnoid cyst     Imitrex 6 mg sc in q 24 prn  Physical therapy evaluation.  Pain is accessed and addressed.  Plan of care was discussed with family. Questions answered.  Would continue to follow.

## 2023-10-31 NOTE — PROGRESS NOTE ADULT - SUBJECTIVE AND OBJECTIVE BOX
Date of Service: 10-31-23 @ 13:03    Patient is a 28y old  Female who presents with a chief complaint of fever (30 Oct 2023 15:03)      INTERVAL HPI/OVERNIGHT EVENTS: Patient seen and examined. NAD. Still with headache    Vital Signs Last 24 Hrs  T(C): 37.1 (31 Oct 2023 11:33), Max: 38.9 (30 Oct 2023 20:55)  T(F): 98.7 (31 Oct 2023 11:33), Max: 102.1 (30 Oct 2023 20:55)  HR: 84 (31 Oct 2023 11:33) (60 - 94)  BP: 125/76 (31 Oct 2023 11:33) (99/67 - 125/76)  BP(mean): --  RR: 18 (31 Oct 2023 11:33) (18 - 19)  SpO2: 98% (31 Oct 2023 11:33) (95% - 98%)    Parameters below as of 31 Oct 2023 11:33  Patient On (Oxygen Delivery Method): room air        10-31    140  |  109<H>  |  4<L>  ----------------------------<  87  3.4<L>   |  23  |  0.44<L>    Ca    8.0<L>      31 Oct 2023 07:20  Mg     2.2     10-31                            10.9   5.48  )-----------( 233      ( 31 Oct 2023 07:20 )             33.4       CAPILLARY BLOOD GLUCOSE        Urinalysis Basic - ( 31 Oct 2023 07:20 )    Color: x / Appearance: x / SG: x / pH: x  Gluc: 87 mg/dL / Ketone: x  / Bili: x / Urobili: x   Blood: x / Protein: x / Nitrite: x   Leuk Esterase: x / RBC: x / WBC x   Sq Epi: x / Non Sq Epi: x / Bacteria: x      Culture - Urine (10.29.23 @ 06:25)   Specimen Source: Clean Catch Clean Catch (Midstream)  Culture Results:   >100,000 CFU/ml Escherichia coliCulture - Blood (10.29.23 @ 06:25)   Specimen Source: .Blood Blood-Peripheral  Culture Results:   No growth at 48 Hours      Historical Values  Culture - Blood (10.29.23 @ 06:25)   Specimen Source: .Blood Blood-Peripheral  Culture Results:   No growth at 48 Hours  Culture - Blood (10.29.23 @ 06:25)   Specimen Source: .Blood Blood-Peripheral  Culture Results:   No growth at 48 Hours  Culture - Blood (07.09.22 @ 09:49)   Specimen Source: .Blood Blood-Peripheral  Culture Results:   No Growth Final  Culture - Blood (07.05.22 @ 15:17)   Specimen Source: .Blood Blood  Culture Results:   No Growth Final  Culture - Blood (07.05.22 @ 15:17)   Specimen Source: .Blood Blood  Culture Results:   No Growth Final  Culture - Blood (11.19.08 @ 17:12)   Culture - Blood: NO ORGANISMS ISOLATED  Specimen Source: BLOOD-PERIPHERAL    < from: MR Head w/wo IV Cont (10.30.23 @ 13:30) >    ACC: [id number] EXAM:  MR BRAIN WAW IC   ORDERED BY: CARYN BLAIR     PROCEDURE DATE:  10/30/2023          INTERPRETATION:  .    CLINICAL INFORMATION: Headache for the past 4 days. History of remote   brain tumor.    TECHNIQUE: Multiplanar multisequential MRI of the brain was acquired with   and without the administration of IV gadolinium. 10 cc's of IV Gadavist   was administered for the purposes of this examination. 0 cc were   discarded.    COMPARISON: Prior head CT examination dated 10/29/2023.    FINDINGS: There is redemonstration of a right-sided middle cranial fossa   arachnoid cyst which appears grossly unchanged in size when compared to   the prior CT exam. The proximal dimensions are 3.4 x 4.7 cm in the axial   plane. Mass effectis seen upon the right anterior temporal lobe. No   associated vasogenic edema is seen.    Otherwise, the brain parenchyma demonstrates normal morphology and   signal. There is no evidence of acute ischemia on diffusion-weighted   imaging.    Ventricular size and configuration is grossly unremarkable. Flow-voids   are noted throughout the major intracranial vessels, on the T2 weighted   images, consistent with their patency. The sella turcica and posterior   fossa are unremarkable.    The paranasal sinuses and tympanomastoid cavities are grossly clear.   Calvarial signal appears unremarkable. The orbits appear within normal   limits.    IMPRESSION: Unchanged appearing right middle cranial fossa arachnoid cyst.    --- End of Report ---            JAYASHREE HOLLOWAY MD; Attending Radiologist  This document has been electronically signed. Oct 30 2023  1:48PM    < end of copied text >          acetaminophen     Tablet .. 650 milliGRAM(s) Oral every 6 hours PRN  aluminum hydroxide/magnesium hydroxide/simethicone Suspension 30 milliLiter(s) Oral every 4 hours PRN  enoxaparin Injectable 40 milliGRAM(s) SubCutaneous every 24 hours  ertapenem  IVPB      melatonin 3 milliGRAM(s) Oral at bedtime PRN  ondansetron Injectable 4 milliGRAM(s) IV Push every 6 hours PRN  sodium chloride 0.9%. 1000 milliLiter(s) IV Continuous <Continuous>  SUMAtriptan Injectable 6 milliGRAM(s) SubCutaneous once              REVIEW OF SYSTEMS:  CONSTITUTIONAL: + fever, no weight loss, or no fatigue  NECK: No pain, no stiffness  RESPIRATORY: No cough, no wheezing, no chills, no hemoptysis, No shortness of breath  CARDIOVASCULAR: No chest pain, no palpitations, no dizziness, no leg swelling  GASTROINTESTINAL: No abdominal pain. No nausea, no vomiting, no hematemesis; No diarrhea, no constipation. No melena, no hematochezia.  GENITOURINARY: No dysuria, no frequency, no hematuria, no incontinence  NEUROLOGICAL: + headaches, no loss of strength, no numbness, no tremors  SKIN: No itching, no burning  MUSCULOSKELETAL: No joint pain, no swelling; No muscle, no back, no extremity pain  PSYCHIATRIC: No depression, no mood swings,   HEME/LYMPH: No easy bruising, no bleeding gums  ALLERY AND IMMUNOLOGIC: No hives       Consultant(s) Notes Reviewed:  [X] YES  [ ] NO    PHYSICAL EXAM:  GENERAL: NAD  HEAD:  Atraumatic, Normocephalic  EYES: EOMI, PERRLA, conjunctiva and sclera clear  ENMT: No tonsillar erythema, exudates, or enlargement; Moist mucous membranes  NECK: Supple, No JVD  NERVOUS SYSTEM:  Awake & alert  CHEST/LUNG: Clear to auscultation bilaterally; No rales, rhonchi, wheezing,  HEART: Regular rate and rhythm  ABDOMEN: Soft, Nontender, Nondistended; Bowel sounds present  EXTREMITIES:  No clubbing, cyanosis, or edema  LYMPH: No lymphadenopathy noted  SKIN: No rashes      Advanced care planning discussed with patient/family [X] YES   [ ] NO    Advanced care planning discussed with patient/family. Patient's health status was discussed. All appropriate changes have been made regarding patient's end-of-life care. Advanced care planning forms reviewed/discussed/completed.  20 minutes spent.

## 2023-10-31 NOTE — PROGRESS NOTE ADULT - SUBJECTIVE AND OBJECTIVE BOX
CARMEN ROJAS is a 28yFemale , patient examined and chart reviewed    INTERVAL HPI/ OVERNIGHT EVENTS:   c/o HAs. Febrile Tax 102.1    PAST MEDICAL & SURGICAL HISTORY:  No pertinent past medical history  No significant past surgical history        For details regarding the patient's social history, family history, and other miscellaneous elements, please refer the initial infectious diseases consultation and/or the admitting history and physical examination for this admission.    ROS:  CONSTITUTIONAL:  + fever or chills  EYES:  Negative  blurry vision or double vision  CARDIOVASCULAR:  Negative for chest pain or palpitations  RESPIRATORY:  Negative for cough, wheezing, or SOB   GASTROINTESTINAL:  Negative for nausea, vomiting, diarrhea, constipation, or abdominal pain  GENITOURINARY:  Negative frequency, urgency or dysuria  NEUROLOGIC:  No headache, confusion, dizziness, lightheadedness  All other systems were reviewed and are negative     ALLERGIES  cefazolin (Hives; Rash)  latex (Rash)  Benadryl (Other)  Reglan (Dystonic RXN)  penicillin (Rash)  Demerol (Other)      Current inpatient medications :    ANTIBIOTICS/RELEVANT:  ertapenem  IVPB        MEDICATIONS  (STANDING):  enoxaparin Injectable 40 milliGRAM(s) SubCutaneous every 24 hours  sodium chloride 0.9%. 1000 milliLiter(s) (100 mL/Hr) IV Continuous <Continuous>  SUMAtriptan Injectable 6 milliGRAM(s) SubCutaneous once    MEDICATIONS  (PRN):  acetaminophen     Tablet .. 650 milliGRAM(s) Oral every 6 hours PRN Temp greater or equal to 38C (100.4F), Mild Pain (1 - 3)  aluminum hydroxide/magnesium hydroxide/simethicone Suspension 30 milliLiter(s) Oral every 4 hours PRN Dyspepsia  melatonin 3 milliGRAM(s) Oral at bedtime PRN Insomnia  ondansetron Injectable 4 milliGRAM(s) IV Push every 6 hours PRN Nausea and/or Vomiting        Objective:  Vital Signs Last 24 Hrs  T(C): 37.1 (31 Oct 2023 11:33), Max: 38.9 (30 Oct 2023 20:55)  T(F): 98.7 (31 Oct 2023 11:33), Max: 102.1 (30 Oct 2023 20:55)  HR: 84 (31 Oct 2023 11:33) (60 - 94)  BP: 125/76 (31 Oct 2023 11:33) (99/67 - 125/76)  RR: 18 (31 Oct 2023 11:33) (18 - 19)  SpO2: 98% (31 Oct 2023 11:33) (95% - 98%)    Parameters below as of 31 Oct 2023 11:33  Patient On (Oxygen Delivery Method): room air    Physical Exam:  General: no acute distress  Neck: supple, trachea midline  Lungs: clear, no wheeze/rhonchi  Cardiovascular: regular rate and rhythm, S1 S2  Abdomen: soft, nontender,  bowel sounds normal  Neurological: alert and oriented x3  Skin: no rash  Extremities: no edema      LABS:                        10.9   5.48  )-----------( 233      ( 31 Oct 2023 07:20 )             33.4   10-31    140  |  109<H>  |  4<L>  ----------------------------<  87  3.4<L>   |  23  |  0.44<L>    Ca    8.0<L>      31 Oct 2023 07:20  Mg     2.2     10-31      Urine Microscopic-Add On (NC) (10.29.23 @ 06:25)    Red Blood Cell - Urine: 10 /HPF   White Blood Cell - Urine: >50 /HPF   Hyaline Casts: 0-2 /HPF   Bacteria: Too Numerous to count /HPF  Urinalysis (10.29.23 @ 06:25)    Glucose Qualitative, Urine: Negative mg/dL   Blood, Urine: Moderate   pH Urine: 5.5   Color: Yellow   Urine Appearance: Turbid   Bilirubin: Negative   Ketone - Urine: 15 mg/dL   Specific Gravity: 1.014   Protein, Urine: 30 mg/dL   Urobilinogen: 1.0 mg/dL   Nitrite: Positive   Leukocyte Esterase Concentration: Large      MICROBIOLOGY:  Culture - Blood (collected 29 Oct 2023 06:25)  Source: .Blood Blood-Peripheral  Preliminary Report (30 Oct 2023 12:01):    No growth at 24 hours    Culture - Blood (collected 29 Oct 2023 06:25)  Source: .Blood Blood-Peripheral  Preliminary Report (30 Oct 2023 12:01):    No growth at 24 hours    Culture - Urine (10.29.23 @ 06:25)    Specimen Source: Clean Catch Clean Catch (Midstream)   Culture Results:   >100,000 CFU/ml Escherichia coli    RADIOLOGY & ADDITIONAL STUDIES:    ACC: [id number] EXAM:  CT ABDOMEN AND PELVIS IC   ORDERED BY: PHILIP LOPEZMADDI     PROCEDURE DATE:  10/29/2023          INTERPRETATION:  CLINICAL INFORMATION: Fever, chills, body aches;   evaluate urinary system    COMPARISON: None.    CONTRAST/COMPLICATIONS:  IV Contrast: Omnipaque 350  90 cc administered   10 cc discarded  Oral Contrast: None  Complications: None    PROCEDURE:  CT of the Abdomen and Pelvis was performed.  Sagittal and coronal reformats were performed.    FINDINGS:  LOWER CHEST: No significant abnormality.    LIVER: Borderline in size. Mild diffuse fatty infiltration.  BILE DUCTS: Normal in caliber.  GALLBLADDER: No intraluminal calcification or pericholecystic fluid  SPLEEN: Normal in size and configuration.  PANCREAS:Unremarkable.  ADRENALS: No mass.  KIDNEYS/URETERS: Subtle area of focal right upper pole hypoenhancement   (5-80 and 4-60). Mild right perinephric and periureteral stranding.   Slight prominence right renal pelvis and proximal-mid ureter. No renal or   ureteral calcifications.    BLADDER: Grossly unremarkable.  REPRODUCTIVE ORGANS: Midline uterus. 9 x 6.5 x 7 cm cystic lesion   anterior to uterus and superior to urinary bladder.    BOWEL: No acute pathology or bowel obstruction. Appendix normal in   caliber.  PERITONEUM: No ascites or free air.  VESSELS: Normal in caliber.  RETROPERITONEUM/LYMPH NODES: No lymphadenopathy.  ABDOMINAL WALL: No abnormal mass or fluid collection.  BONES: No acute fracture or focal destructive or blastic lesion.    IMPRESSION:    Findings compatible with right-sided pyelonephritis.    9 x 6.5 x 7 cm cystic lesion anterior to uterus and superior to urinary   bladder, presumably ovarian. Follow-up pelvic ultrasound is recommended   for further evaluation in this regard.      Assessment :  28f with obesity presents with c/o fever, chills, back pain, headache  photophobia and vomiting  ct with right perinephric stranding  ua pyuria right pyelonephritis ux with >100,000 CFU/ml Escherichia coli  enterovirus - supportive care  Persistent fevers ?ESBL    Plan  Cipro changed to Ertapenem  Trend temps and cbc  follow urine cx   follow blood cx  Pulm toileting    D/w Dr SHARON Choi        Continue with present regiment.  Appropriate use of antibiotics and adverse effects reviewed.      I have discussed the above plan of care with patient in detail. She expressed understanding of the  treatment plan . Risks, benefits and alternatives discussed in detail. I have asked if she has any questions or concerns and appropriately addressed them to the best of my ability .    > 35 minutes were spent in direct patient care reviewing notes, medications ,labs data/ imaging , discussion with multidisciplinary team.    Thank you for allowing me to participate in care of your patient .    Arianna Mejia MD  Infectious Disease  [phone]

## 2023-11-01 LAB
-  AMIKACIN: SIGNIFICANT CHANGE UP
-  AMIKACIN: SIGNIFICANT CHANGE UP
-  AMOXICILLIN/CLAVULANIC ACID: SIGNIFICANT CHANGE UP
-  AMOXICILLIN/CLAVULANIC ACID: SIGNIFICANT CHANGE UP
-  AMPICILLIN/SULBACTAM: SIGNIFICANT CHANGE UP
-  AMPICILLIN/SULBACTAM: SIGNIFICANT CHANGE UP
-  AMPICILLIN: SIGNIFICANT CHANGE UP
-  AMPICILLIN: SIGNIFICANT CHANGE UP
-  AZTREONAM: SIGNIFICANT CHANGE UP
-  AZTREONAM: SIGNIFICANT CHANGE UP
-  CEFAZOLIN: SIGNIFICANT CHANGE UP
-  CEFAZOLIN: SIGNIFICANT CHANGE UP
-  CEFEPIME: SIGNIFICANT CHANGE UP
-  CEFEPIME: SIGNIFICANT CHANGE UP
-  CEFTRIAXONE: SIGNIFICANT CHANGE UP
-  CEFTRIAXONE: SIGNIFICANT CHANGE UP
-  CEFUROXIME: SIGNIFICANT CHANGE UP
-  CEFUROXIME: SIGNIFICANT CHANGE UP
-  CIPROFLOXACIN: SIGNIFICANT CHANGE UP
-  CIPROFLOXACIN: SIGNIFICANT CHANGE UP
-  ERTAPENEM: SIGNIFICANT CHANGE UP
-  ERTAPENEM: SIGNIFICANT CHANGE UP
-  GENTAMICIN: SIGNIFICANT CHANGE UP
-  GENTAMICIN: SIGNIFICANT CHANGE UP
-  IMIPENEM: SIGNIFICANT CHANGE UP
-  IMIPENEM: SIGNIFICANT CHANGE UP
-  LEVOFLOXACIN: SIGNIFICANT CHANGE UP
-  LEVOFLOXACIN: SIGNIFICANT CHANGE UP
-  MEROPENEM: SIGNIFICANT CHANGE UP
-  MEROPENEM: SIGNIFICANT CHANGE UP
-  NITROFURANTOIN: SIGNIFICANT CHANGE UP
-  NITROFURANTOIN: SIGNIFICANT CHANGE UP
-  PIPERACILLIN/TAZOBACTAM: SIGNIFICANT CHANGE UP
-  PIPERACILLIN/TAZOBACTAM: SIGNIFICANT CHANGE UP
-  TOBRAMYCIN: SIGNIFICANT CHANGE UP
-  TOBRAMYCIN: SIGNIFICANT CHANGE UP
-  TRIMETHOPRIM/SULFAMETHOXAZOLE: SIGNIFICANT CHANGE UP
-  TRIMETHOPRIM/SULFAMETHOXAZOLE: SIGNIFICANT CHANGE UP
ANION GAP SERPL CALC-SCNC: 7 MMOL/L — SIGNIFICANT CHANGE UP (ref 5–17)
ANION GAP SERPL CALC-SCNC: 7 MMOL/L — SIGNIFICANT CHANGE UP (ref 5–17)
BUN SERPL-MCNC: 6 MG/DL — LOW (ref 7–23)
BUN SERPL-MCNC: 6 MG/DL — LOW (ref 7–23)
CALCIUM SERPL-MCNC: 8.7 MG/DL — SIGNIFICANT CHANGE UP (ref 8.5–10.1)
CALCIUM SERPL-MCNC: 8.7 MG/DL — SIGNIFICANT CHANGE UP (ref 8.5–10.1)
CHLORIDE SERPL-SCNC: 111 MMOL/L — HIGH (ref 96–108)
CHLORIDE SERPL-SCNC: 111 MMOL/L — HIGH (ref 96–108)
CO2 SERPL-SCNC: 23 MMOL/L — SIGNIFICANT CHANGE UP (ref 22–31)
CO2 SERPL-SCNC: 23 MMOL/L — SIGNIFICANT CHANGE UP (ref 22–31)
CREAT SERPL-MCNC: 0.48 MG/DL — LOW (ref 0.5–1.3)
CREAT SERPL-MCNC: 0.48 MG/DL — LOW (ref 0.5–1.3)
CULTURE RESULTS: ABNORMAL
CULTURE RESULTS: ABNORMAL
CULTURE RESULTS: NO GROWTH — SIGNIFICANT CHANGE UP
CULTURE RESULTS: NO GROWTH — SIGNIFICANT CHANGE UP
EGFR: 133 ML/MIN/1.73M2 — SIGNIFICANT CHANGE UP
EGFR: 133 ML/MIN/1.73M2 — SIGNIFICANT CHANGE UP
GLUCOSE SERPL-MCNC: 89 MG/DL — SIGNIFICANT CHANGE UP (ref 70–99)
GLUCOSE SERPL-MCNC: 89 MG/DL — SIGNIFICANT CHANGE UP (ref 70–99)
METHOD TYPE: SIGNIFICANT CHANGE UP
METHOD TYPE: SIGNIFICANT CHANGE UP
ORGANISM # SPEC MICROSCOPIC CNT: ABNORMAL
POTASSIUM SERPL-MCNC: 4 MMOL/L — SIGNIFICANT CHANGE UP (ref 3.5–5.3)
POTASSIUM SERPL-MCNC: 4 MMOL/L — SIGNIFICANT CHANGE UP (ref 3.5–5.3)
POTASSIUM SERPL-SCNC: 4 MMOL/L — SIGNIFICANT CHANGE UP (ref 3.5–5.3)
POTASSIUM SERPL-SCNC: 4 MMOL/L — SIGNIFICANT CHANGE UP (ref 3.5–5.3)
SODIUM SERPL-SCNC: 141 MMOL/L — SIGNIFICANT CHANGE UP (ref 135–145)
SODIUM SERPL-SCNC: 141 MMOL/L — SIGNIFICANT CHANGE UP (ref 135–145)
SPECIMEN SOURCE: SIGNIFICANT CHANGE UP

## 2023-11-01 PROCEDURE — 72197 MRI PELVIS W/O & W/DYE: CPT | Mod: 26

## 2023-11-01 RX ORDER — SUMATRIPTAN SUCCINATE 4 MG/.5ML
100 INJECTION, SOLUTION SUBCUTANEOUS EVERY 24 HOURS
Refills: 0 | Status: DISCONTINUED | OUTPATIENT
Start: 2023-11-01 | End: 2023-11-02

## 2023-11-01 RX ADMIN — ERTAPENEM SODIUM 120 MILLIGRAM(S): 1 INJECTION, POWDER, LYOPHILIZED, FOR SOLUTION INTRAMUSCULAR; INTRAVENOUS at 13:37

## 2023-11-01 RX ADMIN — Medication 650 MILLIGRAM(S): at 19:27

## 2023-11-01 RX ADMIN — SODIUM CHLORIDE 100 MILLILITER(S): 9 INJECTION INTRAMUSCULAR; INTRAVENOUS; SUBCUTANEOUS at 13:37

## 2023-11-01 NOTE — CHART NOTE - NSCHARTNOTEFT_GEN_A_CORE
Patient admitted with acute pyelonephritis on IV Meropenem, BCX and UCX with NGTD. CT A/P c/w right sided pyelonephritis. Incidental finding of uterine cyst, f/u US with cystic mass superior to urinary bladder. Results reviewed with Dr. Choi, patient and pts mom at bedside. Plan as below.       < from: CT Abdomen and Pelvis w/ IV Cont (10.29.23 @ 08:44) >    IMPRESSION:    Findings compatible with right-sided pyelonephritis.    9 x 6.5 x 7 cm cystic lesion anterior to uterus and superior to urinary   bladder, presumably ovarian. Follow-up pelvic ultrasound is recommended   for further evaluation in this regard.    --- End of Report ---    < end of copied text >      < from: US Pelvis Complete (US Pelvis Complete .) (10.31.23 @ 16:11) >    IMPRESSION:  As noted on CT evaluation there is a 9.1 x 7.3 x 7.1 cm cystic mass   identified superior to the urinary bladder, indeterminate etiology and   clinical significance. Consider contrast enhanced MRI for further   assessment.        --- End of Report ---            ALICIA LUQUE MD; Attending Radiologist  This document has been electronically signed. Oct 31 2023  4:30PM    < end of copied text >          PLAN:    - repeat BMP today  - MRI Pelvis w/wo IV contrast to eval cystic mass  - pt in agreement  - results of MRI will guide clinical management

## 2023-11-01 NOTE — PROGRESS NOTE ADULT - SUBJECTIVE AND OBJECTIVE BOX
Neurology Follow up note    CARMEN [id number]    HPI:  This is a 28 F with no PMH who comes in c/o fevers 102-103 mostly at night for the past four days associated with n/v, chills, back pain, headache with cough, runny nose. Patient denies dysuria, urinary hesitancy, increased frequency or flank pain. At home, everyone has a URI. (29 Oct 2023 17:13)      Interval History -no ha overnight    Patient is seen, chart was reviewed and case was discussed with the treatment team.  Pt is not in any distress.   Lying on bed comfortably.     Vital Signs Last 24 Hrs  T(C): 37.1 (01 Nov 2023 11:33), Max: 37.1 (01 Nov 2023 11:33)  T(F): 98.7 (01 Nov 2023 11:33), Max: 98.7 (01 Nov 2023 11:33)  HR: 73 (01 Nov 2023 11:33) (68 - 86)  BP: 108/63 (01 Nov 2023 11:33) (99/65 - 108/65)  BP(mean): --  RR: 18 (01 Nov 2023 11:33) (18 - 18)  SpO2: 96% (01 Nov 2023 11:33) (96% - 98%)    Parameters below as of 01 Nov 2023 11:33  Patient On (Oxygen Delivery Method): room air          Height (cm): 170.2 (10-31 @ 12:48)  Weight (kg): 107.501 (10-31 @ 12:48)  BMI (kg/m2): 37.1 (10-31 @ 12:48)    REVIEW OF SYSTEMS:    Constitutional: No f weight loss or fatigue  Eyes: No eye pain, visual disturbances, or discharge  ENT:  No difficulty hearing, tinnitus, vertigo; No sinus or throat pain  Neck: No pain or stiffness  Respiratory: No cough, wheezing, chills or hemoptysis  Cardiovascular: No chest pain, palpitations, shortness of breath, dizziness or leg swelling  Gastrointestinal: No  No nausea, vomiting or hematemesis;   Genitourinary: No dysuria,  hematuria or incontinence  Neurological: No h , numbness or tremors or weakness  Psychiatric: No depression, anxiety, mood swings or difficulty sleeping  Musculoskeletal: No joint pain or swelling;   Skin: No itching, burning, rashes or lesions   Lymph Nodes: No enlarged glands  Endocrine: No heat or cold intolerance; No hair loss,   Allergy and Immunologic: No hives or eczema    On Neurological Examination:    Mental Status - Pt is alert, awake, oriented X3. . Follows commands well and able to answer questions appropriately.Mood and affect  normal    Speech -  Normal.    Cranial Nerves - Pupils 3 mm equal and reactive to light, extraocular eye movements intact. Pt has no visual field deficit.  Pt has no  facial asymmetry. Facial sensation is intact.Tongue - is in midline.    Muscle tone - is normal    Motor Exam - 5/5 all over, No drift. No shaking or tremors.    Sensory Exam - Pin prick, temperature, joint position and vibration are intact on either side. Pt withdraws all extremities equally on stimulation. No asymmetry seen. No complaints of tingling, numbness.    coordination:    Finger to nose: normal      Deep tendon Reflexes - 2 plus all over.        Neck Supple -  Yes.     MEDICATIONS    acetaminophen     Tablet .. 650 milliGRAM(s) Oral every 6 hours PRN  aluminum hydroxide/magnesium hydroxide/simethicone Suspension 30 milliLiter(s) Oral every 4 hours PRN  enoxaparin Injectable 40 milliGRAM(s) SubCutaneous every 24 hours  ertapenem  IVPB      melatonin 3 milliGRAM(s) Oral at bedtime PRN  ondansetron Injectable 4 milliGRAM(s) IV Push every 6 hours PRN  sodium chloride 0.9%. 1000 milliLiter(s) IV Continuous <Continuous>      Allergies    cefazolin (Hives; Rash)  latex (Rash)  Benadryl (Other)  Reglan (Dystonic RXN)  penicillin (Rash)  Demerol (Other)    Intolerances                              10.9   5.48  )-----------( 233      ( 31 Oct 2023 07:20 )             33.4         Hemoglobin A1C:     Vitamin B12     RADIOLOGY    ASSESSMENT AND PLAN:      seen for ha  likely migraine  arachnoid cyst-patient aware of this findings    Imitrex 100 mg q 25 hr prn for ha  Physical therapy evaluation.  Pain is accessed and addressed.  Plan of care was discussed with family. Questions answered.  Would continue to follow.

## 2023-11-01 NOTE — PROGRESS NOTE ADULT - SUBJECTIVE AND OBJECTIVE BOX
Date of Service: 11-01-23 @ 14:53    Patient is a 28y old  Female who presents with a chief complaint of fever (01 Nov 2023 12:57)      INTERVAL HPI/OVERNIGHT EVENTS: Patient seen and examined. NAD. No complaints.     Vital Signs Last 24 Hrs  T(C): 37.1 (01 Nov 2023 11:33), Max: 37.1 (01 Nov 2023 11:33)  T(F): 98.7 (01 Nov 2023 11:33), Max: 98.7 (01 Nov 2023 11:33)  HR: 73 (01 Nov 2023 11:33) (68 - 86)  BP: 108/63 (01 Nov 2023 11:33) (99/65 - 108/65)  BP(mean): --  RR: 18 (01 Nov 2023 11:33) (18 - 18)  SpO2: 96% (01 Nov 2023 11:33) (96% - 98%)    Parameters below as of 01 Nov 2023 11:33  Patient On (Oxygen Delivery Method): room air        11-01    141  |  111<H>  |  6<L>  ----------------------------<  89  4.0   |  23  |  0.48<L>    Ca    8.7      01 Nov 2023 12:45  Mg     2.2     10-31                            10.9   5.48  )-----------( 233      ( 31 Oct 2023 07:20 )             33.4       CAPILLARY BLOOD GLUCOSE        Urinalysis Basic - ( 01 Nov 2023 12:45 )    Color: x / Appearance: x / SG: x / pH: x  Gluc: 89 mg/dL / Ketone: x  / Bili: x / Urobili: x   Blood: x / Protein: x / Nitrite: x   Leuk Esterase: x / RBC: x / WBC x   Sq Epi: x / Non Sq Epi: x / Bacteria: x    Culture - Urine (10.29.23 @ 06:25)   - Trimethoprim/Sulfamethoxazole: S <=0.5/9.5  - Ciprofloxacin: S <=0.25  - Ertapenem: S <=0.5  - Gentamicin: S <=2  - Amikacin: S <=16  - Imipenem: S <=1  - Levofloxacin: S <=0.5  - Meropenem: S <=1  - Nitrofurantoin: S <=32 Should not be used to treat pyelonephritis  - Piperacillin/Tazobactam: S <=8  - Tobramycin: S <=2  - Amoxicillin/Clavulanic Acid: S <=8/4  - Ampicillin: R >16 These ampicillin results predict results for amoxicillin  - Ampicillin/Sulbactam: S <=4/2  - Aztreonam: R <=4  - Cefazolin: R >16 For uncomplicated UTI with K. pneumoniae, E. coli, or P. mirablis: KILO <=16 is sensitive and KILO >=32 is resistant. This also predicts results for oral agents cefaclor, cefdinir, cefpodoxime, cefprozil, cefuroxime axetil, cephalexin and locarbef for uncomplicated UTI. Note that some isolates may be susceptible to these agents while testing resistant to cefazolin.  - Cefepime: R <=2  - Ceftriaxone: R 8  - Cefuroxime: R 8  Specimen Source: Clean Catch Clean Catch (Midstream)  Culture Results:   >100,000 CFU/ml Escherichia coli ESBL  Organism Identification: Escherichia coli ESBL  Organism: Escherichia coli ESBL  Method Type: KILO  < from: US Pelvis Complete (US Pelvis Complete .) (10.31.23 @ 16:11) >    ACC: [id number] EXAM:  US PELVIC COMPLETE   ORDERED BY: GAIL CONDON     PROCEDURE DATE:  10/31/2023          INTERPRETATION:  CLINICAL INFORMATION: 9 cm cystic mass anterior to the   uterus and superior to the urinary bladder. Follow-up assessment.    LMP: October 24, 2023.    COMPARISON: CT abdomen/pelvis October 29, 2023.    TECHNIQUE:  Transabdominal pelvic sonogram only. Color and Spectral Doppler was   performed.    FINDINGS:  Uterus: 7.7 x 4.4 x 3.0 cm. Within normal limits.  Endometrium: 7 mm. Within normal limits.    Right ovary: 3.7 x 2.5 x 2.3 cm. Within normal limits. Blood flow   identified within the right ovarian parenchyma.  Left ovary: 2.7 x 2.3 x 1.5 cm. Within normal limits. Blood flow   identified within the left ovarianparenchyma.    As noted on CT evaluation there is a 9.1 x 7.3 x 7.1 cm cystic mass   identified superior to the urinary bladder, indeterminate etiology and   clinical significance.    Fluid: Trace free fluid is identified within the pelvic [id number].    IMPRESSION:  As noted on CT evaluation there is a 9.1 x 7.3 x 7.1 cm cystic mass   identified superior to the urinary bladder, indeterminate etiology and   clinical significance. Consider contrast enhanced MRI for further   assessment.        --- End of Report ---            ALICIA LUQUE MD; Attending Radiologist  This document has been electronically signed. Oct 31 2023  4:30PM    < end of copied text >          acetaminophen     Tablet .. 650 milliGRAM(s) Oral every 6 hours PRN  aluminum hydroxide/magnesium hydroxide/simethicone Suspension 30 milliLiter(s) Oral every 4 hours PRN  enoxaparin Injectable 40 milliGRAM(s) SubCutaneous every 24 hours  ertapenem  IVPB      ertapenem  IVPB 1000 milliGRAM(s) IV Intermittent every 24 hours  melatonin 3 milliGRAM(s) Oral at bedtime PRN  ondansetron Injectable 4 milliGRAM(s) IV Push every 6 hours PRN  sodium chloride 0.9%. 1000 milliLiter(s) IV Continuous <Continuous>  SUMAtriptan 100 milliGRAM(s) Oral every 24 hours PRN              REVIEW OF SYSTEMS:  CONSTITUTIONAL: No fever, no weight loss, or no fatigue  NECK: No pain, no stiffness  RESPIRATORY: No cough, no wheezing, no chills, no hemoptysis, No shortness of breath  CARDIOVASCULAR: No chest pain, no palpitations, no dizziness, no leg swelling  GASTROINTESTINAL: No abdominal pain. No nausea, no vomiting, no hematemesis; No diarrhea, no constipation. No melena, no hematochezia.  GENITOURINARY: No dysuria, no frequency, no hematuria, no incontinence  NEUROLOGICAL: No headaches, no loss of strength, no numbness, no tremors  SKIN: No itching, no burning  MUSCULOSKELETAL: No joint pain, no swelling; No muscle, no back, no extremity pain  PSYCHIATRIC: No depression, no mood swings,   HEME/LYMPH: No easy bruising, no bleeding gums  ALLERY AND IMMUNOLOGIC: No hives       Consultant(s) Notes Reviewed:  [X] YES  [ ] NO    PHYSICAL EXAM:  GENERAL: NAD  HEAD:  Atraumatic, Normocephalic  EYES: EOMI, PERRLA, conjunctiva and sclera clear  ENMT: No tonsillar erythema, exudates, or enlargement; Moist mucous membranes  NECK: Supple, No JVD  NERVOUS SYSTEM:  Awake & alert  CHEST/LUNG: Clear to auscultation bilaterally; No rales, rhonchi, wheezing,  HEART: Regular rate and rhythm  ABDOMEN: Soft, Nontender, Nondistended; Bowel sounds present  EXTREMITIES:  No clubbing, cyanosis, or edema  LYMPH: No lymphadenopathy noted  SKIN: No rashes      Advanced care planning discussed with patient/family [X] YES   [ ] NO    Advanced care planning discussed with patient/family. Patient's health status was discussed. All appropriate changes have been made regarding patient's end-of-life care. Advanced care planning forms reviewed/discussed/completed.  20 minutes spent.

## 2023-11-01 NOTE — PROGRESS NOTE ADULT - SUBJECTIVE AND OBJECTIVE BOX
CARMEN ROJAS is a 28yFemale , patient examined and chart reviewed    INTERVAL HPI/ OVERNIGHT EVENTS:   Feeling better. Afebrile.  In chair.    PAST MEDICAL & SURGICAL HISTORY:  No pertinent past medical history  No significant past surgical history        For details regarding the patient's social history, family history, and other miscellaneous elements, please refer the initial infectious diseases consultation and/or the admitting history and physical examination for this admission.    ROS:  CONSTITUTIONAL:  no fever or chills  EYES:  Negative  blurry vision or double vision  CARDIOVASCULAR:  Negative for chest pain or palpitations  RESPIRATORY:  Negative for cough, wheezing, or SOB   GASTROINTESTINAL:  Negative for nausea, vomiting, diarrhea, constipation, or abdominal pain  GENITOURINARY:  Negative frequency, urgency or dysuria  NEUROLOGIC:  No headache, confusion, dizziness, lightheadedness  All other systems were reviewed and are negative     ALLERGIES  cefazolin (Hives; Rash)  latex (Rash)  Benadryl (Other)  Reglan (Dystonic RXN)  penicillin (Rash)  Demerol (Other)      Current inpatient medications :    ANTIBIOTICS/RELEVANT:  ertapenem  IVPB 1000 milliGRAM(s) IV Intermittent every 24 hours      MEDICATIONS  (STANDING):  enoxaparin Injectable 40 milliGRAM(s) SubCutaneous every 24 hours  sodium chloride 0.9%. 1000 milliLiter(s) (100 mL/Hr) IV Continuous <Continuous>    MEDICATIONS  (PRN):  acetaminophen     Tablet .. 650 milliGRAM(s) Oral every 6 hours PRN Temp greater or equal to 38C (100.4F), Mild Pain (1 - 3)  aluminum hydroxide/magnesium hydroxide/simethicone Suspension 30 milliLiter(s) Oral every 4 hours PRN Dyspepsia  melatonin 3 milliGRAM(s) Oral at bedtime PRN Insomnia  ondansetron Injectable 4 milliGRAM(s) IV Push every 6 hours PRN Nausea and/or Vomiting  SUMAtriptan 100 milliGRAM(s) Oral every 24 hours PRN Headache      Objective:  Vital Signs Last 24 Hrs  T(C): 37.1 (01 Nov 2023 11:33), Max: 37.1 (01 Nov 2023 11:33)  T(F): 98.7 (01 Nov 2023 11:33), Max: 98.7 (01 Nov 2023 11:33)  HR: 73 (01 Nov 2023 11:33) (68 - 86)  BP: 108/63 (01 Nov 2023 11:33) (99/65 - 108/65)  RR: 18 (01 Nov 2023 11:33) (18 - 18)  SpO2: 96% (01 Nov 2023 11:33) (96% - 98%)    Parameters below as of 01 Nov 2023 11:33  Patient On (Oxygen Delivery Method): room air      Physical Exam:  General: no acute distress  Neck: supple, trachea midline  Lungs: clear, no wheeze/rhonchi  Cardiovascular: regular rate and rhythm, S1 S2  Abdomen: soft, nontender,  bowel sounds normal  Neurological: alert and oriented x3  Skin: no rash  Extremities: no edema      LABS:                        10.9   5.48  )-----------( 233      ( 31 Oct 2023 07:20 )             33.4   11-01    141  |  111<H>  |  6<L>  ----------------------------<  89  4.0   |  23  |  0.48<L>    Ca    8.7      01 Nov 2023 12:45  Mg     2.2     10-31    Urine Microscopic-Add On (NC) (10.29.23 @ 06:25)    Red Blood Cell - Urine: 10 /HPF   White Blood Cell - Urine: >50 /HPF   Hyaline Casts: 0-2 /HPF   Bacteria: Too Numerous to count /HPF  Urinalysis (10.29.23 @ 06:25)    Glucose Qualitative, Urine: Negative mg/dL   Blood, Urine: Moderate   pH Urine: 5.5   Color: Yellow   Urine Appearance: Turbid   Bilirubin: Negative   Ketone - Urine: 15 mg/dL   Specific Gravity: 1.014   Protein, Urine: 30 mg/dL   Urobilinogen: 1.0 mg/dL   Nitrite: Positive   Leukocyte Esterase Concentration: Large      MICROBIOLOGY:  Culture - Blood (collected 29 Oct 2023 06:25)  Source: .Blood Blood-Peripheral  Preliminary Report (30 Oct 2023 12:01):    No growth at 24 hours    Culture - Blood (collected 29 Oct 2023 06:25)  Source: .Blood Blood-Peripheral  Preliminary Report (30 Oct 2023 12:01):    No growth at 24 hours    Culture - Urine (10.29.23 @ 06:25)    -  Trimethoprim/Sulfamethoxazole: S <=0.5/9.5   -  Ciprofloxacin: S <=0.25   -  Ertapenem: S <=0.5   -  Gentamicin: S <=2   -  Amikacin: S <=16   -  Imipenem: S <=1   -  Levofloxacin: S <=0.5   -  Meropenem: S <=1   -  Nitrofurantoin: S <=32 Should not be used to treat pyelonephritis   -  Piperacillin/Tazobactam: S <=8   -  Tobramycin: S <=2   -  Amoxicillin/Clavulanic Acid: S <=8/4   -  Ampicillin: R >16 These ampicillin results predict results for amoxicillin   -  Ampicillin/Sulbactam: S <=4/2   -  Aztreonam: R <=4   -  Cefazolin: R >16 For uncomplicated UTI with K. pneumoniae, E. coli, or P. mirablis: KILO <=16 is sensitive and KILO >=32 is resistant. This also predicts results for oral agents cefaclor, cefdinir, cefpodoxime, cefprozil, cefuroxime axetil, cephalexin and locarbef for uncomplicated UTI. Note that some isolates may be susceptible to these agents while testing resistant to cefazolin.   -  Cefepime: R <=2   -  Ceftriaxone: R 8   -  Cefuroxime: R 8   Specimen Source: Clean Catch Clean Catch (Midstream)   Culture Results:   >100,000 CFU/ml Escherichia coli ESBL   Organism Identification: Escherichia coli ESBL   Organism: Escherichia coli ESBL   Method Type: KILO        RADIOLOGY & ADDITIONAL STUDIES:    ACC: [id number] EXAM:  CT ABDOMEN AND PELVIS IC   ORDERED BY: PHILIP MELENDEZ     PROCEDURE DATE:  10/29/2023          INTERPRETATION:  CLINICAL INFORMATION: Fever, chills, body aches;   evaluate urinary system    COMPARISON: None.    CONTRAST/COMPLICATIONS:  IV Contrast: Omnipaque 350  90 cc administered   10 cc discarded  Oral Contrast: None  Complications: None    PROCEDURE:  CT of the Abdomen and Pelvis was performed.  Sagittal and coronal reformats were performed.    FINDINGS:  LOWER CHEST: No significant abnormality.    LIVER: Borderline in size. Mild diffuse fatty infiltration.  BILE DUCTS: Normal in caliber.  GALLBLADDER: No intraluminal calcification or pericholecystic fluid  SPLEEN: Normal in size and configuration.  PANCREAS:Unremarkable.  ADRENALS: No mass.  KIDNEYS/URETERS: Subtle area of focal right upper pole hypoenhancement   (5-80 and 4-60). Mild right perinephric and periureteral stranding.   Slight prominence right renal pelvis and proximal-mid ureter. No renal or   ureteral calcifications.    BLADDER: Grossly unremarkable.  REPRODUCTIVE ORGANS: Midline uterus. 9 x 6.5 x 7 cm cystic lesion   anterior to uterus and superior to urinary bladder.    BOWEL: No acute pathology or bowel obstruction. Appendix normal in   caliber.  PERITONEUM: No ascites or free air.  VESSELS: Normal in caliber.  RETROPERITONEUM/LYMPH NODES: No lymphadenopathy.  ABDOMINAL WALL: No abnormal mass or fluid collection.  BONES: No acute fracture or focal destructive or blastic lesion.    IMPRESSION:    Findings compatible with right-sided pyelonephritis.    9 x 6.5 x 7 cm cystic lesion anterior to uterus and superior to urinary   bladder, presumably ovarian. Follow-up pelvic ultrasound is recommended   for further evaluation in this regard.      Assessment :  28f with obesity presents with c/o fever, chills, back pain, headache  photophobia and vomiting  ct with right perinephric stranding  ua pyuria right pyelonephritis ux with >100,000 CFU/ml Escherichia coli ESBL  enterovirus - supportive care  Fevers resolved    Plan  Cont Ertapenem 1 gram IV daily x 14 days till 11/14/23  Trend temps and cbc  Pt to go to [facility] infusion for IV antibiotics  No need for PICC or midline  Dc home after tmrw's Ertapenem dose    D/w Dr SHARON Choi    Continue with present regiment.  Appropriate use of antibiotics and adverse effects reviewed.      I have discussed the above plan of care with patient in detail. She expressed understanding of the  treatment plan . Risks, benefits and alternatives discussed in detail. I have asked if she has any questions or concerns and appropriately addressed them to the best of my ability .    > 35 minutes were spent in direct patient care reviewing notes, medications ,labs data/ imaging , discussion with multidisciplinary team.    Thank you for allowing me to participate in care of your patient .    Arianna Mejia MD  Infectious Disease  [phone]

## 2023-11-02 ENCOUNTER — TRANSCRIPTION ENCOUNTER (OUTPATIENT)
Age: 28
End: 2023-11-02

## 2023-11-02 VITALS
DIASTOLIC BLOOD PRESSURE: 70 MMHG | RESPIRATION RATE: 17 BRPM | HEART RATE: 76 BPM | OXYGEN SATURATION: 94 % | SYSTOLIC BLOOD PRESSURE: 102 MMHG | TEMPERATURE: 98 F

## 2023-11-02 PROCEDURE — 72197 MRI PELVIS W/O & W/DYE: CPT

## 2023-11-02 PROCEDURE — 76856 US EXAM PELVIC COMPLETE: CPT

## 2023-11-02 PROCEDURE — 85027 COMPLETE CBC AUTOMATED: CPT

## 2023-11-02 PROCEDURE — A9579: CPT

## 2023-11-02 PROCEDURE — 70553 MRI BRAIN STEM W/O & W/DYE: CPT

## 2023-11-02 PROCEDURE — 85652 RBC SED RATE AUTOMATED: CPT

## 2023-11-02 PROCEDURE — 86140 C-REACTIVE PROTEIN: CPT

## 2023-11-02 PROCEDURE — 85025 COMPLETE CBC W/AUTO DIFF WBC: CPT

## 2023-11-02 PROCEDURE — 80048 BASIC METABOLIC PNL TOTAL CA: CPT

## 2023-11-02 PROCEDURE — 83735 ASSAY OF MAGNESIUM: CPT

## 2023-11-02 PROCEDURE — 87086 URINE CULTURE/COLONY COUNT: CPT

## 2023-11-02 PROCEDURE — 36415 COLL VENOUS BLD VENIPUNCTURE: CPT

## 2023-11-02 PROCEDURE — 84145 PROCALCITONIN (PCT): CPT

## 2023-11-02 RX ORDER — ERTAPENEM SODIUM 1 G/1
1 INJECTION, POWDER, LYOPHILIZED, FOR SOLUTION INTRAMUSCULAR; INTRAVENOUS
Qty: 0 | Refills: 0 | DISCHARGE
Start: 2023-11-02

## 2023-11-02 RX ORDER — SUMATRIPTAN SUCCINATE 4 MG/.5ML
1 INJECTION, SOLUTION SUBCUTANEOUS
Qty: 15 | Refills: 0
Start: 2023-11-02

## 2023-11-02 RX ADMIN — Medication 1 APPLICATION(S): at 14:36

## 2023-11-02 RX ADMIN — ERTAPENEM SODIUM 120 MILLIGRAM(S): 1 INJECTION, POWDER, LYOPHILIZED, FOR SOLUTION INTRAMUSCULAR; INTRAVENOUS at 12:06

## 2023-11-02 NOTE — DISCHARGE NOTE NURSING/CASE MANAGEMENT/SOCIAL WORK - NSDCPEFALRISK_GEN_ALL_CORE
For information on Fall & Injury Prevention, visit: [email or web address] OR  [email or web address] OR  https://www.cdc.gov/steadi/patient.html

## 2023-11-02 NOTE — PROGRESS NOTE ADULT - SUBJECTIVE AND OBJECTIVE BOX
Neurology Follow up note    CARMEN [id number]    HPI:  This is a 28 F with no PMH who comes in c/o fevers 102-103 mostly at night for the past four days associated with n/v, chills, back pain, headache with cough, runny nose. Patient denies dysuria, urinary hesitancy, increased frequency or flank pain. At home, everyone has a URI. (29 Oct 2023 17:13)      Interval History -no ha overnight    Patient is seen, chart was reviewed and case was discussed with the treatment team.  Pt is not in any distress.   Lying on bed comfortably.     Vital Signs Last 24 Hrs  T(C): 36.6 (02 Nov 2023 13:08), Max: 36.8 (02 Nov 2023 05:33)  T(F): 97.8 (02 Nov 2023 13:08), Max: 98.3 (02 Nov 2023 05:33)  HR: 76 (02 Nov 2023 13:08) (67 - 79)  BP: 102/70 (02 Nov 2023 13:08) (102/70 - 113/78)  BP(mean): --  RR: 17 (02 Nov 2023 13:08) (17 - 17)  SpO2: 94% (02 Nov 2023 13:08) (94% - 98%)    Parameters below as of 02 Nov 2023 13:08  Patient On (Oxygen Delivery Method): room air              Height (cm): 170.2 (10-31 @ 12:48)  Weight (kg): 107.501 (10-31 @ 12:48)  BMI (kg/m2): 37.1 (10-31 @ 12:48)    REVIEW OF SYSTEMS:    Constitutional: No f weight loss or fatigue  Eyes: No eye pain, visual disturbances, or discharge  ENT:  No difficulty hearing, tinnitus, vertigo; No sinus or throat pain  Neck: No pain or stiffness  Respiratory: No cough, wheezing, chills or hemoptysis  Cardiovascular: No chest pain, palpitations, shortness of breath, dizziness or leg swelling  Gastrointestinal: No  No nausea, vomiting or hematemesis;   Genitourinary: No dysuria,  hematuria or incontinence  Neurological: No h , numbness or tremors or weakness  Psychiatric: No depression, anxiety, mood swings or difficulty sleeping  Musculoskeletal: No joint pain or swelling;   Skin: No itching, burning, rashes or lesions   Lymph Nodes: No enlarged glands  Endocrine: No heat or cold intolerance; No hair loss,   Allergy and Immunologic: No hives or eczema    On Neurological Examination:    Mental Status - Pt is alert, awake, oriented X3. . Follows commands well and able to answer questions appropriately.Mood and affect  normal    Speech -  Normal.    Cranial Nerves - Pupils 3 mm equal and reactive to light, extraocular eye movements intact. Pt has no visual field deficit.  Pt has no  facial asymmetry. Facial sensation is intact.Tongue - is in midline.    Muscle tone - is normal    Motor Exam - 5/5 all over, No drift. No shaking or tremors.    Sensory Exam - Pin prick, temperature, joint position and vibration are intact on either side. Pt withdraws all extremities equally on stimulation. No asymmetry seen. No complaints of tingling, numbness.    coordination:    Finger to nose: normal      Deep tendon Reflexes - 2 plus all over.        Neck Supple -  Yes.     MEDICATIONS    acetaminophen     Tablet .. 650 milliGRAM(s) Oral every 6 hours PRN  aluminum hydroxide/magnesium hydroxide/simethicone Suspension 30 milliLiter(s) Oral every 4 hours PRN  enoxaparin Injectable 40 milliGRAM(s) SubCutaneous every 24 hours  ertapenem  IVPB      melatonin 3 milliGRAM(s) Oral at bedtime PRN  ondansetron Injectable 4 milliGRAM(s) IV Push every 6 hours PRN  sodium chloride 0.9%. 1000 milliLiter(s) IV Continuous <Continuous>      Allergies    cefazolin (Hives; Rash)  latex (Rash)  Benadryl (Other)  Reglan (Dystonic RXN)  penicillin (Rash)  Demerol (Other)    Intolerances               Hemoglobin A1C:     Vitamin B12     RADIOLOGY    ASSESSMENT AND PLAN:      seen for ha  likely migraine  arachnoid cyst-patient aware of this findings    Imitrex 100 mg q 25 hr prn for ha  neuro follow up as put patient  .

## 2023-11-02 NOTE — CASE MANAGEMENT PROGRESS NOTE - NSCMPROGRESSNOTE_GEN_ALL_CORE
Pt was cleared for transition home today as per clearance from MOON Mejia. This CM met at the bedside along with the pt's mother. The pt is in agreement with the plan of care for home. The pt was set up with [facility] for the remainder of the SHYANNE until 11/14/23. The pt states she spoke to [facility] and was set up for her IV infusions. The pt's mother will assist and transport her home today. The bedside RN is aware of the dispo plan.

## 2023-11-02 NOTE — PROGRESS NOTE ADULT - SUBJECTIVE AND OBJECTIVE BOX
Date of Service: 11-02-23 @ 13:46    Patient is a 28y old  Female who presents with a chief complaint of fever (01 Nov 2023 14:52)      INTERVAL HPI/OVERNIGHT EVENTS: Patient seen and examined. NAD. No complaints.    Vital Signs Last 24 Hrs  T(C): 36.6 (02 Nov 2023 13:08), Max: 36.8 (02 Nov 2023 05:33)  T(F): 97.8 (02 Nov 2023 13:08), Max: 98.3 (02 Nov 2023 05:33)  HR: 76 (02 Nov 2023 13:08) (67 - 79)  BP: 102/70 (02 Nov 2023 13:08) (102/70 - 113/78)  BP(mean): --  RR: 17 (02 Nov 2023 13:08) (17 - 17)  SpO2: 94% (02 Nov 2023 13:08) (94% - 98%)    Parameters below as of 02 Nov 2023 13:08  Patient On (Oxygen Delivery Method): room air        11-01    141  |  111<H>  |  6<L>  ----------------------------<  89  4.0   |  23  |  0.48<L>    Ca    8.7      01 Nov 2023 12:45          CAPILLARY BLOOD GLUCOSE        Urinalysis Basic - ( 01 Nov 2023 12:45 )    Color: x / Appearance: x / SG: x / pH: x  Gluc: 89 mg/dL / Ketone: x  / Bili: x / Urobili: x   Blood: x / Protein: x / Nitrite: x   Leuk Esterase: x / RBC: x / WBC x   Sq Epi: x / Non Sq Epi: x / Bacteria: x              acetaminophen     Tablet .. 650 milliGRAM(s) Oral every 6 hours PRN  aluminum hydroxide/magnesium hydroxide/simethicone Suspension 30 milliLiter(s) Oral every 4 hours PRN  clobetasol 0.05% Ointment 1 Application(s) Topical two times a day  enoxaparin Injectable 40 milliGRAM(s) SubCutaneous every 24 hours  ertapenem  IVPB      ertapenem  IVPB 1000 milliGRAM(s) IV Intermittent every 24 hours  melatonin 3 milliGRAM(s) Oral at bedtime PRN  ondansetron Injectable 4 milliGRAM(s) IV Push every 6 hours PRN  SUMAtriptan 100 milliGRAM(s) Oral every 24 hours PRN              REVIEW OF SYSTEMS:  CONSTITUTIONAL: No fever, no weight loss, or no fatigue  NECK: No pain, no stiffness  RESPIRATORY: No cough, no wheezing, no chills, no hemoptysis, No shortness of breath  CARDIOVASCULAR: No chest pain, no palpitations, no dizziness, no leg swelling  GASTROINTESTINAL: No abdominal pain. No nausea, no vomiting, no hematemesis; No diarrhea, no constipation. No melena, no hematochezia.  GENITOURINARY: No dysuria, no frequency, no hematuria, no incontinence  NEUROLOGICAL: No headaches, no loss of strength, no numbness, no tremors  SKIN: No itching, no burning  MUSCULOSKELETAL: No joint pain, no swelling; No muscle, no back, no extremity pain  PSYCHIATRIC: No depression, no mood swings,   HEME/LYMPH: No easy bruising, no bleeding gums  ALLERY AND IMMUNOLOGIC: No hives       Consultant(s) Notes Reviewed:  [X] YES  [ ] NO    PHYSICAL EXAM:  GENERAL: NAD  HEAD:  Atraumatic, Normocephalic  EYES: EOMI, PERRLA, conjunctiva and sclera clear  ENMT: No tonsillar erythema, exudates, or enlargement; Moist mucous membranes  NECK: Supple, No JVD  NERVOUS SYSTEM:  Awake & alert  CHEST/LUNG: Clear to auscultation bilaterally; No rales, rhonchi, wheezing,  HEART: Regular rate and rhythm  ABDOMEN: Soft, Nontender, Nondistended; Bowel sounds present  EXTREMITIES:  No clubbing, cyanosis, or edema  LYMPH: No lymphadenopathy noted  SKIN: No rashes      Advanced care planning discussed with patient/family [X] YES   [ ] NO    Advanced care planning discussed with patient/family. Patient's health status was discussed. All appropriate changes have been made regarding patient's end-of-life care. Advanced care planning forms reviewed/discussed/completed.  20 minutes spent.    Date of Service: 11-02-23 @ 13:46    Patient is a 28y old  Female who presents with a chief complaint of fever (01 Nov 2023 14:52)      INTERVAL HPI/OVERNIGHT EVENTS: Patient seen and examined. NAD. No complaints.    Vital Signs Last 24 Hrs  T(C): 36.6 (02 Nov 2023 13:08), Max: 36.8 (02 Nov 2023 05:33)  T(F): 97.8 (02 Nov 2023 13:08), Max: 98.3 (02 Nov 2023 05:33)  HR: 76 (02 Nov 2023 13:08) (67 - 79)  BP: 102/70 (02 Nov 2023 13:08) (102/70 - 113/78)  BP(mean): --  RR: 17 (02 Nov 2023 13:08) (17 - 17)  SpO2: 94% (02 Nov 2023 13:08) (94% - 98%)    Parameters below as of 02 Nov 2023 13:08  Patient On (Oxygen Delivery Method): room air        11-01    141  |  111<H>  |  6<L>  ----------------------------<  89  4.0   |  23  |  0.48<L>    Ca    8.7      01 Nov 2023 12:45          CAPILLARY BLOOD GLUCOSE        Urinalysis Basic - ( 01 Nov 2023 12:45 )    Color: x / Appearance: x / SG: x / pH: x  Gluc: 89 mg/dL / Ketone: x  / Bili: x / Urobili: x   Blood: x / Protein: x / Nitrite: x   Leuk Esterase: x / RBC: x / WBC x   Sq Epi: x / Non Sq Epi: x / Bacteria: x    < from: MR Pelvis w/wo IV Cont (11.01.23 @ 17:56) >    ACC: [id number] EXAM:  MR PELVIS WAW IC   ORDERED BY: CARYN BLAIR     PROCEDURE DATE:  11/01/2023          INTERPRETATION:  CLINICAL INFORMATION: Evaluate cystic mass above bladder    COMPARISON: Pelvic ultrasound October 31, 2023, CT abdomen/pelvis October 29, 2023    CONTRAST/COMPLICATIONS:  IV Contrast: Gadavist  10 cc administered   0 cc discarded  Oral Contrast: NONE  Complications: None reported at time of study completion    PROCEDURE:  MRI of the pelvis was performed.  -    FINDINGS:  UTERUS: Normal in size, shape and contour.  ENDOMETRIUM: Normal.  JUNCTIONAL ZONE: Normal.    OVARIES: Both ovaries are normal, containing several physiologic   follicles. Inseparable from both ovaries is a central pelvic cystic mass   measuring 9.4 x 6.8 x 7.3 cm (TR, AP, CC) and containing no solid   components or septations.    BLADDER: Within normal limits. The above described cystic mass is   separate from the urachal ligaments.  LYMPH NODES: No pelvic lymphadenopathy.    VISUALIZED PORTIONS:    ABDOMINAL ORGANS: Within normal limits.  BOWEL: Within normal limits.  PERITONEUM: No ascites.  VESSELS: Within normal limits.  ABDOMINAL WALL: Within normal limits.  BONES: Within normal limits.    IMPRESSION:    9.4 cm simple appearing central pelvic cystic mass. Favor that this most   likely represents a paraovarian or exophytic ovarian cyst. Recommend   follow-up ultrasound in 6 weeks to reassess.        --- End of Report ---            PJ WILBURN MD; Attending Radiologist  This document has been electronically signed. Nov 2 2023 10:42AM    < end of copied text >            acetaminophen     Tablet .. 650 milliGRAM(s) Oral every 6 hours PRN  aluminum hydroxide/magnesium hydroxide/simethicone Suspension 30 milliLiter(s) Oral every 4 hours PRN  clobetasol 0.05% Ointment 1 Application(s) Topical two times a day  enoxaparin Injectable 40 milliGRAM(s) SubCutaneous every 24 hours  ertapenem  IVPB      ertapenem  IVPB 1000 milliGRAM(s) IV Intermittent every 24 hours  melatonin 3 milliGRAM(s) Oral at bedtime PRN  ondansetron Injectable 4 milliGRAM(s) IV Push every 6 hours PRN  SUMAtriptan 100 milliGRAM(s) Oral every 24 hours PRN              REVIEW OF SYSTEMS:  CONSTITUTIONAL: No fever, no weight loss, or no fatigue  NECK: No pain, no stiffness  RESPIRATORY: No cough, no wheezing, no chills, no hemoptysis, No shortness of breath  CARDIOVASCULAR: No chest pain, no palpitations, no dizziness, no leg swelling  GASTROINTESTINAL: No abdominal pain. No nausea, no vomiting, no hematemesis; No diarrhea, no constipation. No melena, no hematochezia.  GENITOURINARY: No dysuria, no frequency, no hematuria, no incontinence  NEUROLOGICAL: No headaches, no loss of strength, no numbness, no tremors  SKIN: No itching, no burning  MUSCULOSKELETAL: No joint pain, no swelling; No muscle, no back, no extremity pain  PSYCHIATRIC: No depression, no mood swings,   HEME/LYMPH: No easy bruising, no bleeding gums  ALLERY AND IMMUNOLOGIC: No hives       Consultant(s) Notes Reviewed:  [X] YES  [ ] NO    PHYSICAL EXAM:  GENERAL: NAD  HEAD:  Atraumatic, Normocephalic  EYES: EOMI, PERRLA, conjunctiva and sclera clear  ENMT: No tonsillar erythema, exudates, or enlargement; Moist mucous membranes  NECK: Supple, No JVD  NERVOUS SYSTEM:  Awake & alert  CHEST/LUNG: Clear to auscultation bilaterally; No rales, rhonchi, wheezing,  HEART: Regular rate and rhythm  ABDOMEN: Soft, Nontender, Nondistended; Bowel sounds present  EXTREMITIES:  No clubbing, cyanosis, or edema  LYMPH: No lymphadenopathy noted  SKIN: No rashes      Advanced care planning discussed with patient/family [X] YES   [ ] NO    Advanced care planning discussed with patient/family. Patient's health status was discussed. All appropriate changes have been made regarding patient's end-of-life care. Advanced care planning forms reviewed/discussed/completed.  20 minutes spent.

## 2023-11-02 NOTE — PROGRESS NOTE ADULT - PROVIDER SPECIALTY LIST ADULT
Infectious Disease
Neurology
Infectious Disease
Infectious Disease
Neurology
Neurology
Infectious Disease
Internal Medicine

## 2023-11-02 NOTE — PROGRESS NOTE ADULT - PROBLEM SELECTOR PLAN 1
Continue IV Cipro  Blood cultures NTD  F/U urine culture data  ID f/u  Further work-up/management pending clinical course.
ESBL pyelonephritis -- will need total 10 days of iv ertapenem  Blood cultures NTD  ID f/u
Changed to iv Meropenem  Blood cultures NTD  F/U urine culture data  ID f/u  Further work-up/management pending clinical course.
ESBL pyelonephritis -- will need total 10 days of iv ertapenem  Patient to go to [facility] infusion center starting tomorrow at 730AM for 10 days   Blood cultures NTD  ID f/u

## 2023-11-02 NOTE — PROGRESS NOTE ADULT - SUBJECTIVE AND OBJECTIVE BOX
CARMEN ROJAS is a 28yFemale , patient examined and chart reviewed    INTERVAL HPI/ OVERNIGHT EVENTS:   Feeling better. Afebrile.  No events.    PAST MEDICAL & SURGICAL HISTORY:  No pertinent past medical history  No significant past surgical history        For details regarding the patient's social history, family history, and other miscellaneous elements, please refer the initial infectious diseases consultation and/or the admitting history and physical examination for this admission.    ROS:  CONSTITUTIONAL:  no fever or chills  EYES:  Negative  blurry vision or double vision  CARDIOVASCULAR:  Negative for chest pain or palpitations  RESPIRATORY:  Negative for cough, wheezing, or SOB   GASTROINTESTINAL:  Negative for nausea, vomiting, diarrhea, constipation, or abdominal pain  GENITOURINARY:  Negative frequency, urgency or dysuria  NEUROLOGIC:  No headache, confusion, dizziness, lightheadedness  All other systems were reviewed and are negative     ALLERGIES  cefazolin (Hives; Rash)  latex (Rash)  Benadryl (Other)  Reglan (Dystonic RXN)  penicillin (Rash)  Demerol (Other)      Current inpatient medications :    ANTIBIOTICS/RELEVANT:  ertapenem  IVPB 1000 milliGRAM(s) IV Intermittent every 24 hours      MEDICATIONS  (STANDING):  clobetasol 0.05% Ointment 1 Application(s) Topical two times a day  enoxaparin Injectable 40 milliGRAM(s) SubCutaneous every 24 hours    MEDICATIONS  (PRN):  acetaminophen     Tablet .. 650 milliGRAM(s) Oral every 6 hours PRN Temp greater or equal to 38C (100.4F), Mild Pain (1 - 3)  aluminum hydroxide/magnesium hydroxide/simethicone Suspension 30 milliLiter(s) Oral every 4 hours PRN Dyspepsia  melatonin 3 milliGRAM(s) Oral at bedtime PRN Insomnia  ondansetron Injectable 4 milliGRAM(s) IV Push every 6 hours PRN Nausea and/or Vomiting  SUMAtriptan 100 milliGRAM(s) Oral every 24 hours PRN Headache      Objective:  Vital Signs Last 24 Hrs  T(C): 36.6 (02 Nov 2023 13:08), Max: 36.8 (02 Nov 2023 05:33)  T(F): 97.8 (02 Nov 2023 13:08), Max: 98.3 (02 Nov 2023 05:33)  HR: 76 (02 Nov 2023 13:08) (67 - 79)  BP: 102/70 (02 Nov 2023 13:08) (102/70 - 113/78)  RR: 17 (02 Nov 2023 13:08) (17 - 17)  SpO2: 94% (02 Nov 2023 13:08) (94% - 98%)    Parameters below as of 02 Nov 2023 13:08  Patient On (Oxygen Delivery Method): room air      Physical Exam:  General: no acute distress  Neck: supple, trachea midline  Lungs: clear, no wheeze/rhonchi  Cardiovascular: regular rate and rhythm, S1 S2  Abdomen: soft, nontender,  bowel sounds normal  Neurological: alert and oriented x3  Skin: no rash  Extremities: no edema      LABS:      MICROBIOLOGY:  Urine Microscopic-Add On (NC) (10.29.23 @ 06:25)    Red Blood Cell - Urine: 10 /HPF   White Blood Cell - Urine: >50 /HPF   Hyaline Casts: 0-2 /HPF   Bacteria: Too Numerous to count /HPF  Urinalysis (10.29.23 @ 06:25)    Glucose Qualitative, Urine: Negative mg/dL   Blood, Urine: Moderate   pH Urine: 5.5   Color: Yellow   Urine Appearance: Turbid   Bilirubin: Negative   Ketone - Urine: 15 mg/dL   Specific Gravity: 1.014   Protein, Urine: 30 mg/dL   Urobilinogen: 1.0 mg/dL   Nitrite: Positive   Leukocyte Esterase Concentration: Large      MICROBIOLOGY:  Culture - Blood (collected 29 Oct 2023 06:25)  Source: .Blood Blood-Peripheral  Preliminary Report (30 Oct 2023 12:01):    No growth at 24 hours    Culture - Blood (collected 29 Oct 2023 06:25)  Source: .Blood Blood-Peripheral  Preliminary Report (30 Oct 2023 12:01):    No growth at 24 hours    Culture - Urine (10.29.23 @ 06:25)    -  Trimethoprim/Sulfamethoxazole: S <=0.5/9.5   -  Ciprofloxacin: S <=0.25   -  Ertapenem: S <=0.5   -  Gentamicin: S <=2   -  Amikacin: S <=16   -  Imipenem: S <=1   -  Levofloxacin: S <=0.5   -  Meropenem: S <=1   -  Nitrofurantoin: S <=32 Should not be used to treat pyelonephritis   -  Piperacillin/Tazobactam: S <=8   -  Tobramycin: S <=2   -  Amoxicillin/Clavulanic Acid: S <=8/4   -  Ampicillin: R >16 These ampicillin results predict results for amoxicillin   -  Ampicillin/Sulbactam: S <=4/2   -  Aztreonam: R <=4   -  Cefazolin: R >16 For uncomplicated UTI with K. pneumoniae, E. coli, or P. mirablis: KILO <=16 is sensitive and KILO >=32 is resistant. This also predicts results for oral agents cefaclor, cefdinir, cefpodoxime, cefprozil, cefuroxime axetil, cephalexin and locarbef for uncomplicated UTI. Note that some isolates may be susceptible to these agents while testing resistant to cefazolin.   -  Cefepime: R <=2   -  Ceftriaxone: R 8   -  Cefuroxime: R 8   Specimen Source: Clean Catch Clean Catch (Midstream)   Culture Results:   >100,000 CFU/ml Escherichia coli ESBL   Organism Identification: Escherichia coli ESBL   Organism: Escherichia coli ESBL   Method Type: KLIO        RADIOLOGY & ADDITIONAL STUDIES:    ACC: [id number] EXAM:  CT ABDOMEN AND PELVIS IC   ORDERED BY: PHILIP MELENDEZ     PROCEDURE DATE:  10/29/2023          INTERPRETATION:  CLINICAL INFORMATION: Fever, chills, body aches;   evaluate urinary system    COMPARISON: None.    CONTRAST/COMPLICATIONS:  IV Contrast: Omnipaque 350  90 cc administered   10 cc discarded  Oral Contrast: None  Complications: None    PROCEDURE:  CT of the Abdomen and Pelvis was performed.  Sagittal and coronal reformats were performed.    FINDINGS:  LOWER CHEST: No significant abnormality.    LIVER: Borderline in size. Mild diffuse fatty infiltration.  BILE DUCTS: Normal in caliber.  GALLBLADDER: No intraluminal calcification or pericholecystic fluid  SPLEEN: Normal in size and configuration.  PANCREAS:Unremarkable.  ADRENALS: No mass.  KIDNEYS/URETERS: Subtle area of focal right upper pole hypoenhancement   (5-80 and 4-60). Mild right perinephric and periureteral stranding.   Slight prominence right renal pelvis and proximal-mid ureter. No renal or   ureteral calcifications.    BLADDER: Grossly unremarkable.  REPRODUCTIVE ORGANS: Midline uterus. 9 x 6.5 x 7 cm cystic lesion   anterior to uterus and superior to urinary bladder.    BOWEL: No acute pathology or bowel obstruction. Appendix normal in   caliber.  PERITONEUM: No ascites or free air.  VESSELS: Normal in caliber.  RETROPERITONEUM/LYMPH NODES: No lymphadenopathy.  ABDOMINAL WALL: No abnormal mass or fluid collection.  BONES: No acute fracture or focal destructive or blastic lesion.    IMPRESSION:    Findings compatible with right-sided pyelonephritis.    9 x 6.5 x 7 cm cystic lesion anterior to uterus and superior to urinary   bladder, presumably ovarian. Follow-up pelvic ultrasound is recommended   for further evaluation in this regard.      Assessment :  28f with obesity presents with c/o fever, chills, back pain, headache  photophobia and vomiting  ct with right perinephric stranding  ua pyuria right pyelonephritis ux with >100,000 CFU/ml Escherichia coli ESBL  enterovirus - supportive care  Fevers resolved  Clinically improved    Plan  Cont Ertapenem 1 gram IV daily x 14 days till 11/14/23  Trend temps and cbc  Pt to go to [facility] infusion for IV antibiotics- all set up and confirmed  No need for PICC or midline  Dc home today    D/w Dr SHARON Choi    Continue with present regiment.  Appropriate use of antibiotics and adverse effects reviewed.      I have discussed the above plan of care with patient in detail. She expressed understanding of the  treatment plan . Risks, benefits and alternatives discussed in detail. I have asked if she has any questions or concerns and appropriately addressed them to the best of my ability .    > 35 minutes were spent in direct patient care reviewing notes, medications ,labs data/ imaging , discussion with multidisciplinary team.    Thank you for allowing me to participate in care of your patient .    Arianna Mejia MD  Infectious Disease  [phone]

## 2023-11-02 NOTE — PROGRESS NOTE ADULT - PROBLEM SELECTOR PLAN 2
----- Message from Pierre Tam MD sent at 1/27/2023  1:18 PM CST -----  Iron levels are normal. Please notify the parent.  
Likely migraine  MRI noted -- stable arachnoid cyst  Sumatriptan prn  Neuro eval noted  Further work-up/management pending clinical course.
Likely migraine  MRI noted -- stable arachnoid cyst  Sumatriptan prn  Neuro eval noted  Further work-up/management pending clinical course.
Spoke with mom. Mom notified of results. All questions answered. Mom verbalized understanding.   
Likely migraine  Patient has remote history of "benign brain tumor"  Check MRI  Neuro eval  Further work-up/management pending clinical course.
Likely migraine  MRI noted -- stable arachnoid cyst  Sumatriptan prn  Neuro eval noted  Further work-up/management pending clinical course.

## 2023-11-02 NOTE — PROGRESS NOTE ADULT - PROBLEM SELECTOR PLAN 3
Will get pelvic ultrasound
U/S noted  Check MRI
U/S noted  MRI noted  Needs outpatient f/u within 6 weeks  Patient aware
Patient will f/u with her gyn for further w/u (pelvic u/c, etc.)

## 2023-11-03 LAB
CULTURE RESULTS: SIGNIFICANT CHANGE UP
SPECIMEN SOURCE: SIGNIFICANT CHANGE UP

## 2023-11-19 NOTE — CHART NOTE - NSCHARTNOTESELECT_GEN_ALL_CORE
Patient contacted & notified of disqualified E/V-visit.   Patient informed that they would not be charged for the visit.   If you have questions about this e-visit, please call back [phone].     Patient declined further triage or information at this time.    Urgent Care/Immediate Care Clinic appointment was not made.    Denies other questions or concerns at this time.     Transfer Note

## 2024-12-12 NOTE — CARE COORDINATION ASSESSMENT. - NSPTRESPFORCHILD_GEN_ALL_CORE
Airway    Performed by: Yani Loaiza MD  Authorized by: Yani Loaiza MD    Final Airway Type:  Endotracheal airway  Final Endotracheal Airway*:  ETT  ETT Size (mm)*:  8.0  Cuff*:  Regular  Technique Used for Successful ETT Placement:  Direct laryngoscopy  Devices/Methods Used in Placement*:  Mask and Oral ETT  Intubation Procedure*:  Preoxygenation, ETCO2, Atraumatic, Dentition Unchanged and Pharynx Clear  Insertion Site:  Oral  Blade Type*:  MAC  Blade Size*:  4  Secured at (cm)*:  24  Placement Verified by: auscultation and capnometry    Glottic View*:  1 - full view of glottis  Attempts*:  1  Location:  OR  Urgency:  Elective  Difficult Airway: No    Indications for Airway Management:  Anesthesia  Mask Difficulty Assessment:  1 - vent by mask  Start Time: 12/12/2024 7:12 AM      
No

## 2025-04-05 NOTE — CARE COORDINATION ASSESSMENT. - NSDCPLANSERVICES_GEN_ALL_CORE
Patient presented to the ED complaining of generalized abdominal pain and left ear pain for 2 weeks.
No Anticipated Discharge Needs

## 2025-04-27 ENCOUNTER — NON-APPOINTMENT (OUTPATIENT)
Age: 30
End: 2025-04-27